# Patient Record
Sex: MALE | Race: WHITE | Employment: FULL TIME | URBAN - METROPOLITAN AREA
[De-identification: names, ages, dates, MRNs, and addresses within clinical notes are randomized per-mention and may not be internally consistent; named-entity substitution may affect disease eponyms.]

---

## 2021-12-16 ENCOUNTER — HOSPITAL ENCOUNTER (INPATIENT)
Age: 51
LOS: 7 days | Discharge: HOME | DRG: 286 | End: 2021-12-23
Attending: EMERGENCY MEDICINE | Admitting: HOSPITALIST
Payer: COMMERCIAL

## 2021-12-16 ENCOUNTER — APPOINTMENT (OUTPATIENT)
Dept: GENERAL RADIOLOGY | Age: 51
DRG: 286 | End: 2021-12-16
Attending: EMERGENCY MEDICINE
Payer: COMMERCIAL

## 2021-12-16 ENCOUNTER — APPOINTMENT (OUTPATIENT)
Dept: CT IMAGING | Age: 51
DRG: 286 | End: 2021-12-16
Attending: HOSPITALIST
Payer: COMMERCIAL

## 2021-12-16 DIAGNOSIS — I50.9 ACUTE HEART FAILURE, UNSPECIFIED HEART FAILURE TYPE (HCC): Primary | ICD-10-CM

## 2021-12-16 DIAGNOSIS — J90 PLEURAL EFFUSION: ICD-10-CM

## 2021-12-16 DIAGNOSIS — E11.9 NEW ONSET TYPE 2 DIABETES MELLITUS (HCC): ICD-10-CM

## 2021-12-16 DIAGNOSIS — R77.8 ELEVATED TROPONIN: ICD-10-CM

## 2021-12-16 DIAGNOSIS — I50.9 HEART FAILURE, UNSPECIFIED HF CHRONICITY, UNSPECIFIED HEART FAILURE TYPE (HCC): ICD-10-CM

## 2021-12-16 PROBLEM — R06.00 DYSPNEA: Status: ACTIVE | Noted: 2021-12-16

## 2021-12-16 LAB
ALBUMIN SERPL-MCNC: 3.4 G/DL (ref 3.5–5)
ALBUMIN/GLOB SERPL: 1.1 {RATIO} (ref 1.1–2.2)
ALP SERPL-CCNC: 74 U/L (ref 45–117)
ALT SERPL-CCNC: 128 U/L (ref 12–78)
ANION GAP SERPL CALC-SCNC: 7 MMOL/L (ref 5–15)
AST SERPL-CCNC: 45 U/L (ref 15–37)
ATRIAL RATE: 109 BPM
BASOPHILS # BLD: 0.1 K/UL (ref 0–0.1)
BASOPHILS NFR BLD: 0 % (ref 0–1)
BILIRUB SERPL-MCNC: 1.4 MG/DL (ref 0.2–1)
BNP SERPL-MCNC: 1933 PG/ML
BUN SERPL-MCNC: 13 MG/DL (ref 6–20)
BUN/CREAT SERPL: 12 (ref 12–20)
CALCIUM SERPL-MCNC: 9 MG/DL (ref 8.5–10.1)
CALCULATED P AXIS, ECG09: 44 DEGREES
CALCULATED R AXIS, ECG10: 47 DEGREES
CALCULATED T AXIS, ECG11: 27 DEGREES
CHLORIDE SERPL-SCNC: 103 MMOL/L (ref 97–108)
CO2 SERPL-SCNC: 24 MMOL/L (ref 21–32)
COMMENT, HOLDF: NORMAL
COVID-19 RAPID TEST, COVR: NOT DETECTED
CREAT SERPL-MCNC: 1.06 MG/DL (ref 0.7–1.3)
DIAGNOSIS, 93000: NORMAL
DIFFERENTIAL METHOD BLD: ABNORMAL
EOSINOPHIL # BLD: 0.1 K/UL (ref 0–0.4)
EOSINOPHIL NFR BLD: 1 % (ref 0–7)
ERYTHROCYTE [DISTWIDTH] IN BLOOD BY AUTOMATED COUNT: 13.2 % (ref 11.5–14.5)
GLOBULIN SER CALC-MCNC: 3 G/DL (ref 2–4)
GLUCOSE SERPL-MCNC: 297 MG/DL (ref 65–100)
HCT VFR BLD AUTO: 47.1 % (ref 36.6–50.3)
HGB BLD-MCNC: 15.8 G/DL (ref 12.1–17)
IMM GRANULOCYTES # BLD AUTO: 0.1 K/UL (ref 0–0.04)
IMM GRANULOCYTES NFR BLD AUTO: 1 % (ref 0–0.5)
LACTATE SERPL-SCNC: 1.2 MMOL/L (ref 0.4–2)
LYMPHOCYTES # BLD: 2.3 K/UL (ref 0.8–3.5)
LYMPHOCYTES NFR BLD: 16 % (ref 12–49)
MCH RBC QN AUTO: 30.1 PG (ref 26–34)
MCHC RBC AUTO-ENTMCNC: 33.5 G/DL (ref 30–36.5)
MCV RBC AUTO: 89.7 FL (ref 80–99)
MONOCYTES # BLD: 1 K/UL (ref 0–1)
MONOCYTES NFR BLD: 7 % (ref 5–13)
NEUTS SEG # BLD: 11.3 K/UL (ref 1.8–8)
NEUTS SEG NFR BLD: 75 % (ref 32–75)
NRBC # BLD: 0 K/UL (ref 0–0.01)
NRBC BLD-RTO: 0 PER 100 WBC
P-R INTERVAL, ECG05: 172 MS
PLATELET # BLD AUTO: 289 K/UL (ref 150–400)
PMV BLD AUTO: 10.6 FL (ref 8.9–12.9)
POTASSIUM SERPL-SCNC: 4 MMOL/L (ref 3.5–5.1)
PROT SERPL-MCNC: 6.4 G/DL (ref 6.4–8.2)
Q-T INTERVAL, ECG07: 338 MS
QRS DURATION, ECG06: 106 MS
QTC CALCULATION (BEZET), ECG08: 455 MS
RBC # BLD AUTO: 5.25 M/UL (ref 4.1–5.7)
SAMPLES BEING HELD,HOLD: NORMAL
SODIUM SERPL-SCNC: 134 MMOL/L (ref 136–145)
SOURCE, COVRS: NORMAL
TROPONIN-HIGH SENSITIVITY: 74 NG/L (ref 0–76)
TROPONIN-HIGH SENSITIVITY: 74 NG/L (ref 0–76)
VENTRICULAR RATE, ECG03: 109 BPM
WBC # BLD AUTO: 14.9 K/UL (ref 4.1–11.1)

## 2021-12-16 PROCEDURE — 65660000000 HC RM CCU STEPDOWN

## 2021-12-16 PROCEDURE — 84484 ASSAY OF TROPONIN QUANT: CPT

## 2021-12-16 PROCEDURE — 87040 BLOOD CULTURE FOR BACTERIA: CPT

## 2021-12-16 PROCEDURE — 83880 ASSAY OF NATRIURETIC PEPTIDE: CPT

## 2021-12-16 PROCEDURE — 74011250636 HC RX REV CODE- 250/636: Performed by: EMERGENCY MEDICINE

## 2021-12-16 PROCEDURE — 93005 ELECTROCARDIOGRAM TRACING: CPT

## 2021-12-16 PROCEDURE — 74011250637 HC RX REV CODE- 250/637: Performed by: HOSPITALIST

## 2021-12-16 PROCEDURE — 99285 EMERGENCY DEPT VISIT HI MDM: CPT

## 2021-12-16 PROCEDURE — 71046 X-RAY EXAM CHEST 2 VIEWS: CPT

## 2021-12-16 PROCEDURE — 71275 CT ANGIOGRAPHY CHEST: CPT

## 2021-12-16 PROCEDURE — 36415 COLL VENOUS BLD VENIPUNCTURE: CPT

## 2021-12-16 PROCEDURE — 85025 COMPLETE CBC W/AUTO DIFF WBC: CPT

## 2021-12-16 PROCEDURE — 74011000636 HC RX REV CODE- 636: Performed by: RADIOLOGY

## 2021-12-16 PROCEDURE — 74011250636 HC RX REV CODE- 250/636: Performed by: HOSPITALIST

## 2021-12-16 PROCEDURE — 74011250637 HC RX REV CODE- 250/637: Performed by: EMERGENCY MEDICINE

## 2021-12-16 PROCEDURE — 80053 COMPREHEN METABOLIC PANEL: CPT

## 2021-12-16 PROCEDURE — 96360 HYDRATION IV INFUSION INIT: CPT

## 2021-12-16 PROCEDURE — 83605 ASSAY OF LACTIC ACID: CPT

## 2021-12-16 PROCEDURE — G0378 HOSPITAL OBSERVATION PER HR: HCPCS

## 2021-12-16 PROCEDURE — 94761 N-INVAS EAR/PLS OXIMETRY MLT: CPT

## 2021-12-16 PROCEDURE — 87635 SARS-COV-2 COVID-19 AMP PRB: CPT

## 2021-12-16 RX ORDER — GUAIFENESIN 100 MG/5ML
325 LIQUID (ML) ORAL
Status: COMPLETED | OUTPATIENT
Start: 2021-12-16 | End: 2021-12-16

## 2021-12-16 RX ORDER — MAGNESIUM SULFATE 100 %
4 CRYSTALS MISCELLANEOUS AS NEEDED
Status: DISCONTINUED | OUTPATIENT
Start: 2021-12-16 | End: 2021-12-23 | Stop reason: HOSPADM

## 2021-12-16 RX ORDER — DEXTROSE 50 % IN WATER (D50W) INTRAVENOUS SYRINGE
25-50 AS NEEDED
Status: DISCONTINUED | OUTPATIENT
Start: 2021-12-16 | End: 2021-12-23 | Stop reason: HOSPADM

## 2021-12-16 RX ORDER — CARVEDILOL 6.25 MG/1
12.5 TABLET ORAL 2 TIMES DAILY WITH MEALS
Status: DISCONTINUED | OUTPATIENT
Start: 2021-12-16 | End: 2021-12-20

## 2021-12-16 RX ORDER — FUROSEMIDE 10 MG/ML
40 INJECTION INTRAMUSCULAR; INTRAVENOUS DAILY
Status: DISCONTINUED | OUTPATIENT
Start: 2021-12-16 | End: 2021-12-18

## 2021-12-16 RX ORDER — INSULIN LISPRO 100 [IU]/ML
INJECTION, SOLUTION INTRAVENOUS; SUBCUTANEOUS
Status: DISCONTINUED | OUTPATIENT
Start: 2021-12-16 | End: 2021-12-23 | Stop reason: HOSPADM

## 2021-12-16 RX ADMIN — CARVEDILOL 12.5 MG: 12.5 TABLET, FILM COATED ORAL at 19:13

## 2021-12-16 RX ADMIN — IOPAMIDOL 100 ML: 755 INJECTION, SOLUTION INTRAVENOUS at 15:44

## 2021-12-16 RX ADMIN — SODIUM CHLORIDE 1000 ML: 9 INJECTION, SOLUTION INTRAVENOUS at 13:49

## 2021-12-16 RX ADMIN — ASPIRIN 81 MG CHEWABLE TABLET 324 MG: 81 TABLET CHEWABLE at 16:16

## 2021-12-16 RX ADMIN — FUROSEMIDE 40 MG: 10 INJECTION, SOLUTION INTRAVENOUS at 19:13

## 2021-12-16 NOTE — Clinical Note
Right radial artery. Accessed successfully. Radial access needle used. Using ultrasound guidance.  Number of attempts =  1.

## 2021-12-16 NOTE — Clinical Note
Right brachial vein. Accessed successfully. Radial access needle used. Using ultrasound guidance. Number of attempts =  3.

## 2021-12-16 NOTE — ED PROVIDER NOTES
69-year-old male, vaccinated for Covid, presents with complaints of 1 week dyspnea on exertion associated with fatigue. Patient reports [facility] and was advised to come to the emergency department for abnormal EKG. [facility] significant for WBC of 14, glucose of 327. Patient denies any history of high blood sugar, diabetes. Denies fever, chills, nausea, vomiting, diarrhea, constipation. Denies chest pain. [facility]id test at [facility] last night pending. Denies leg swelling, asymmetry. Denies hemoptysis. Denies history of DVT/PE. Denies any recent immobilization/trauma. Denies tobacco use, drug use, alcohol use  [facility] shows sinus tachycardia, poor R wave progression, no acute ischemia. No past medical history on file. No past surgical history on file. No family history on file. Social History     Socioeconomic History    Marital status: SINGLE     Spouse name: Not on file    Number of children: Not on file    Years of education: Not on file    Highest education level: Not on file   Occupational History    Not on file   Tobacco Use    Smoking status: Not on file    Smokeless tobacco: Not on file   Substance and Sexual Activity    Alcohol use: Not on file    Drug use: Not on file    Sexual activity: Not on file   Other Topics Concern    Not on file   Social History Narrative    Not on file     Social Determinants of Health     Financial Resource Strain:     Difficulty of Paying Living Expenses: Not on file   Food Insecurity:     Worried About Running Out of Food in the Last Year: Not on file    Joy of Food in the Last Year: Not on file   Transportation Needs:     Lack of Transportation (Medical): Not on file    Lack of Transportation (Non-Medical):  Not on file   Physical Activity:     Days of Exercise per Week: Not on file    Minutes of Exercise per Session: Not on file   Stress:     Feeling of Stress : Not on file   Social Connections:     Frequency of Communication with Friends and Family: Not on file    Frequency of Social Gatherings with Friends and Family: Not on file    Attends Hinduism Services: Not on file    Active Member of Clubs or Organizations: Not on file    Attends Club or Organization Meetings: Not on file    Marital Status: Not on file   Intimate Partner Violence:     Fear of Current or Ex-Partner: Not on file    Emotionally Abused: Not on file    Physically Abused: Not on file    Sexually Abused: Not on file   Housing Stability:     Unable to Pay for Housing in the Last Year: Not on file    Number of [place] in the Last Year: Not on file    Unstable Housing in the Last Year: Not on file         ALLERGIES: Penicillins    Review of Systems   Constitutional: Negative for chills and fever. HENT: Negative for congestion, nosebleeds and sore throat. Eyes: Negative for pain and discharge. Respiratory: Positive for shortness of breath. Negative for cough. Cardiovascular: Negative for chest pain and palpitations. Gastrointestinal: Negative for abdominal pain, constipation, nausea and vomiting. Genitourinary: Negative for decreased urine volume, dysuria, flank pain and urgency. Musculoskeletal: Negative for gait problem and myalgias. Skin: Negative for rash and wound. Neurological: Negative for seizures and syncope. Hematological: Does not bruise/bleed easily. Psychiatric/Behavioral: Negative for confusion, self-injury and suicidal ideas. Vitals:    12/16/21 1206 12/16/21 1457   BP: 113/70 (!) 148/94   Pulse: (!) 115 (!) 104   Resp: 28 22   Temp: 97.6 °F (36.4 °C) 97.8 °F (36.6 °C)   SpO2: 96% 96%            Physical Exam  Vitals and nursing note reviewed. Constitutional:       Appearance: He is well-developed. He is obese. HENT:      Head: Normocephalic and atraumatic.    Eyes:      Pupils: Pupils are equal, round, and reactive to light. Cardiovascular:      Rate and Rhythm: Normal rate and regular rhythm. Heart sounds: Normal heart sounds. Pulmonary:      Effort: Pulmonary effort is normal. No respiratory distress. Breath sounds: Normal breath sounds. No wheezing. Abdominal:      General: Bowel sounds are normal.      Palpations: Abdomen is soft. Tenderness: There is no abdominal tenderness. There is no guarding or rebound. Musculoskeletal:         General: Normal range of motion. Cervical back: Normal range of motion and neck supple. Skin:     General: Skin is warm and dry. Neurological:      Mental Status: He is alert and oriented to person, place, and time. Psychiatric:         Behavior: Behavior normal.          MDM  Number of Diagnoses or Management Options  Diagnosis management comments: 25-year-old male presents with complaints of 1 week worsening shortness of breath. Patient is obese, tachycardic, mildly tachypneic, normal room oxygen saturation, clear to auscultation bilaterally, afebrile, nontoxic, hemodynamically stable. Planchest x-ray, CBC/CMP/cardiac enzymes/BNP, IV fluid hydration, lactate, blood cultures. Chest x-ray shows bilateral small pleural effusions  Labs remarkable for troponin 74, WBC 14.9         Amount and/or Complexity of Data Reviewed  Clinical lab tests: ordered and reviewed  Tests in the radiology section of CPT®: ordered and reviewed  Independent visualization of images, tracings, or specimens: yes    Patient Progress  Patient progress: improved         Procedures      ED EKG interpretation:  Rhythm: sinus tachycardia; and regular . Rate (approx.): 109; Axis: normal; P wave: normal; QRS interval: normal ; ST/T wave: normal; Other findings: low voltage, no acute ischemia. This EKG was interpreted by Goyo Masterson MD,ED Provider. 3:19 PM  Discussed results with patient. Patient reports feeling much improved. Discussed need for admission.   Patient expresses understanding. Denies any history of heart failure. Perfect Serve Consult for Admission  3:26 PM    ED Room Number: R33/R33  Patient Name and age:  Nile Bolton 46 y.o.  male  Working Diagnosis:   1. Acute heart failure, unspecified heart failure type ([address]    2. Pleural effusion    3.  Elevated troponin        COVID-19 Suspicion:  no  Sepsis present:  no  Reassessment needed: no  Code Status:  Full Code  Readmission: no  Isolation Requirements:  no  Recommended Level of Care:  telemetry  Department:[facility] Adult ED - 21

## 2021-12-16 NOTE — H&P
History & Physical    Primary Care Provider: None  Source of Information: Patient      History of Presenting Illness:   Richard Linn is a 46 y.o. male who presents with dyspnea     14-year-old male, vaccinated for Covid, presents with complaints of 1 week dyspnea on exertion associated with fatigue. Patient reports he [facility] night and was advised to come to the emergency department for abnormal EKG. [facility] significant for WBC of 14, glucose of 327. Patient denies any history of high blood sugar, diabetes. denied CHF, denied cad. Not taking any medications at home. Denies fever, chills, nausea, vomiting, diarrhea, constipation. Denies chest pain. [facility] at [facility] last night pending. Denies leg swelling, asymmetry. Denies hemoptysis. Denies history of DVT/PE. Denies any recent immobilization/trauma. In ER, noticed dyspnea after mild exertion, and difficulty to lie flat due to sob      Review of Systems:  General: HPI, no changes of weight  HEENT: no headache, no vision changes, no nose discharge, no hearing changes   RES: HPI, no cough   CVS: no cp, no palpitation. Muscular: no joint swelling, no muscle pain, no leg swelling  Skin: no rash, no itching   GI: no vomiting, no diarrhea  : no dysuria, no hematuria  Hemo: no gum bleeding, no petechial   Neuro: no sensation changes, no focal weakness   Endo: no polydipsia   Psych: denied depression     No past medical history on file. denied   No past surgical history on file. denied   Prior to Admission medications    Not on File   not taking any   Allergies   Allergen Reactions    Penicillins Unknown (comments)     Pt reports it happened when he was little \"my mother told me to never take it. It made me very sick\"      No family history on file. Father  of CVA at 52's.    SOCIAL HISTORY:  Patient resides:  Independently x   Assisted Living    SNF With family care       Smoking history:   None x   Former    Chronic      Alcohol history:   None x   Social    Chronic      Ambulates:   Independently x   w/cane    w/walker    w/wc    CODE STATUS:  DNR    Full x   Other      Objective:     Physical Exam:     Visit Vitals  BP (!) 148/94 (BP 1 Location: Right upper arm, BP Patient Position: At rest)   Pulse (!) 104   Temp 97.8 °F (36.6 °C)   Resp 22   SpO2 96%      O2 Device: None (Room air)    General:  Alert, cooperative, no distress, appears stated age. Head:  Normocephalic, without obvious abnormality, atraumatic. Eyes:  Conjunctivae/corneas clear. PERRL, EOMs intact. Nose: Nares normal. Septum midline. Mucosa normal. No drainage or sinus tenderness. Throat: Lips, mucosa, and tongue normal    Neck: Supple, symmetrical, trachea midline, no adenopathy, thyroid: no enlargement/tenderness/nodules, no carotid bruit and no JVD. Back:   Symmetric, no curvature. ROM normal. No CVA tenderness. Lungs:   Clear to auscultation bilaterally. No wheezing. Chest wall:  No tenderness or deformity. Heart:  Regular rate and rhythm, S1, S2 normal, no murmur, click, rub or gallop. Abdomen:   Soft, non-tender. Bowel sounds normal. No masses,  No organomegaly. Extremities: Extremities normal, atraumatic, no cyanosis or edema. Pulses: 2+ and symmetric all extremities. Skin: Skin color, texture, turgor normal. No rashes or lesions   Neurologic: CNII-XII intact. None focal            Data Review:     Recent Days:  Recent Labs     12/16/21  1234   WBC 14.9*   HGB 15.8   HCT 47.1        Recent Labs     12/16/21  1234   *   K 4.0      CO2 24   *   BUN 13   CREA 1.06   CA 9.0   ALB 3.4*   *     No results for input(s): PH, PCO2, PO2, HCO3, FIO2 in the last 72 hours.     24 Hour Results:  Recent Results (from the past 24 hour(s))   EKG, 12 LEAD, INITIAL    Collection Time: 12/16/21 12:22 PM   Result Value Ref Range    Ventricular Rate 109 BPM    Atrial Rate 109 BPM    P-R Interval 172 ms    QRS Duration 106 ms    Q-T Interval 338 ms    QTC Calculation (Bezet) 455 ms    Calculated P Axis 44 degrees    Calculated R Axis 47 degrees    Calculated T Axis 27 degrees    Diagnosis       Sinus tachycardia  Possible Left atrial enlargement  Low voltage QRS  Septal infarct , age undetermined  Abnormal ECG  No previous ECGs available  Confirmed by Jonas Conde MD ([id number]) on 12/16/2021 2:40:38 PM     TROPONIN-HIGH SENSITIVITY    Collection Time: 12/16/21 12:34 PM   Result Value Ref Range    Troponin-High Sensitivity 74 0 - 76 ng/L   CBC WITH AUTOMATED DIFF    Collection Time: 12/16/21 12:34 PM   Result Value Ref Range    WBC 14.9 (H) 4.1 - 11.1 K/uL    RBC 5.25 4. 10 - 5.70 M/uL    HGB 15.8 12.1 - 17.0 g/dL    HCT 47.1 36.6 - 50.3 %    MCV 89.7 80.0 - 99.0 FL    MCH 30.1 26.0 - 34.0 PG    MCHC 33.5 30.0 - 36.5 g/dL    RDW 13.2 11.5 - 14.5 %    PLATELET 702 064 - 599 K/uL    MPV 10.6 8.9 - 12.9 FL    NRBC 0.0 0  WBC    ABSOLUTE NRBC 0.00 0.00 - 0.01 K/uL    NEUTROPHILS 75 32 - 75 %    LYMPHOCYTES 16 12 - 49 %    MONOCYTES 7 5 - 13 %    EOSINOPHILS 1 0 - 7 %    BASOPHILS 0 0 - 1 %    IMMATURE GRANULOCYTES 1 (H) 0.0 - 0.5 %    ABS. NEUTROPHILS 11.3 (H) 1.8 - 8.0 K/UL    ABS. LYMPHOCYTES 2.3 0.8 - 3.5 K/UL    ABS. MONOCYTES 1.0 0.0 - 1.0 K/UL    ABS. EOSINOPHILS 0.1 0.0 - 0.4 K/UL    ABS. BASOPHILS 0.1 0.0 - 0.1 K/UL    ABS. IMM.  GRANS. 0.1 (H) 0.00 - 0.04 K/UL    DF AUTOMATED     METABOLIC PANEL, COMPREHENSIVE    Collection Time: 12/16/21 12:34 PM   Result Value Ref Range    Sodium 134 (L) 136 - 145 mmol/L    Potassium 4.0 3.5 - 5.1 mmol/L    Chloride 103 97 - 108 mmol/L    CO2 24 21 - 32 mmol/L    Anion gap 7 5 - 15 mmol/L    Glucose 297 (H) 65 - 100 mg/dL    BUN 13 6 - 20 MG/DL    Creatinine 1.06 0.70 - 1.30 MG/DL    BUN/Creatinine ratio 12 12 - 20      GFR est AA >60 >60 ml/min/1.73m2    GFR est non-AA >60 >60 ml/min/1.73m2    Calcium 9.0 8.5 - 10.1 MG/DL    Bilirubin, total 1.4 (H) 0.2 - 1.0 MG/DL    ALT (SGPT) 128 (H) 12 - 78 U/L    AST (SGOT) 45 (H) 15 - 37 U/L    Alk. phosphatase 74 45 - 117 U/L    Protein, total 6.4 6.4 - 8.2 g/dL    Albumin 3.4 (L) 3.5 - 5.0 g/dL    Globulin 3.0 2.0 - 4.0 g/dL    A-G Ratio 1.1 1.1 - 2.2     LACTIC ACID    Collection Time: 12/16/21 12:34 PM   Result Value Ref Range    Lactic acid 1.2 0.4 - 2.0 MMOL/L   SAMPLES BEING HELD    Collection Time: 12/16/21 12:34 PM   Result Value Ref Range    SAMPLES BEING HELD 1BLU 1RED     COMMENT        Add-on orders for these samples will be processed based on acceptable specimen integrity and analyte stability, which may vary by analyte. NT-PRO BNP    Collection Time: 12/16/21 12:34 PM   Result Value Ref Range    NT pro-BNP 1,933 (H) <125 PG/ML         Imaging:   XR CHEST PA LAT    Result Date: 12/16/2021  Very small bilateral pleural effusions. CTA CHEST W OR W WO CONT    Result Date: 12/16/2021  1. Cardiomegaly, and findings compatible with diminished right heart function. 2. Mild pulmonary edema and moderate bilateral pleural effusions. 3. This constellation of findings is suggestive of congestive heart failure. 4. No pulmonary embolism. Assessment:     Active Problems:    Dyspnea (12/16/2021)           Plan:     1. Acute CHF: CTA of chest reviewed, no PE. Start IV lasix 40 mg daily. Will start coreg. Pending Echo. Cardiologist consult. Repeat rapid covid pending   2. HTN: start coreg   3. Hyperglycemia: likely new diagnose of DM, SSI for now. DTC consult.  Check A1c        Signed By: Bj Vidal MD     December 16, 2021

## 2021-12-16 NOTE — Clinical Note
Patient Class[de-identified] OBSERVATION [104]   Type of Bed: Remote Telemetry [29]   Cardiac Monitoring Required?: Yes   Reason for Observation: dyspnea   Admitting Diagnosis: Dyspnea [[id number]]   Admitting Physician: Mat Li [[id number]   Attending Physician: Mat Li [[id number]]

## 2021-12-17 ENCOUNTER — APPOINTMENT (OUTPATIENT)
Dept: NON INVASIVE DIAGNOSTICS | Age: 51
DRG: 286 | End: 2021-12-17
Attending: HOSPITALIST
Payer: COMMERCIAL

## 2021-12-17 DIAGNOSIS — E11.9 NEW ONSET TYPE 2 DIABETES MELLITUS (HCC): Primary | ICD-10-CM

## 2021-12-17 LAB
ALBUMIN SERPL-MCNC: 3.1 G/DL (ref 3.5–5)
ALBUMIN/GLOB SERPL: 1.2 {RATIO} (ref 1.1–2.2)
ALP SERPL-CCNC: 62 U/L (ref 45–117)
ALT SERPL-CCNC: 109 U/L (ref 12–78)
ANION GAP SERPL CALC-SCNC: 6 MMOL/L (ref 5–15)
AST SERPL-CCNC: 37 U/L (ref 15–37)
BASOPHILS # BLD: 0.1 K/UL (ref 0–0.1)
BASOPHILS NFR BLD: 0 % (ref 0–1)
BILIRUB SERPL-MCNC: 1 MG/DL (ref 0.2–1)
BUN SERPL-MCNC: 17 MG/DL (ref 6–20)
BUN/CREAT SERPL: 16 (ref 12–20)
CALCIUM SERPL-MCNC: 8.5 MG/DL (ref 8.5–10.1)
CHLORIDE SERPL-SCNC: 104 MMOL/L (ref 97–108)
CO2 SERPL-SCNC: 25 MMOL/L (ref 21–32)
CREAT SERPL-MCNC: 1.05 MG/DL (ref 0.7–1.3)
DIFFERENTIAL METHOD BLD: ABNORMAL
ECHO AO ROOT DIAM: 3 CM
ECHO AO ROOT INDEX: 1.22 CM/M2
ECHO AV AREA PEAK VELOCITY: 2.1 CM2
ECHO AV AREA PEAK VELOCITY: 2.1 CM2
ECHO AV PEAK GRADIENT: 4 MMHG
ECHO AV PEAK VELOCITY: 1 M/S
ECHO AV VELOCITY RATIO: 0.6
ECHO EST RA PRESSURE: 12 MMHG
ECHO LA DIAMETER INDEX: 2.36 CM/M2
ECHO LA DIAMETER: 5.8 CM
ECHO LA TO AORTIC ROOT RATIO: 1.93
ECHO LV E' LATERAL VELOCITY: 7 CM/S
ECHO LV E' SEPTAL VELOCITY: 3 CM/S
ECHO LV FRACTIONAL SHORTENING: 11 % (ref 28–44)
ECHO LV INTERNAL DIMENSION DIASTOLE INDEX: 2.93 CM/M2
ECHO LV INTERNAL DIMENSION DIASTOLIC: 7.2 CM (ref 4.2–5.9)
ECHO LV INTERNAL DIMENSION SYSTOLIC INDEX: 2.6 CM/M2
ECHO LV INTERNAL DIMENSION SYSTOLIC: 6.4 CM
ECHO LV IVSD: 1 CM (ref 0.6–1)
ECHO LV MASS 2D: 296.6 G (ref 88–224)
ECHO LV MASS INDEX 2D: 120.6 G/M2 (ref 49–115)
ECHO LV POSTERIOR WALL DIASTOLIC: 0.8 CM (ref 0.6–1)
ECHO LV RELATIVE WALL THICKNESS RATIO: 0.22
ECHO LVOT AREA: 3.5 CM2
ECHO LVOT DIAM: 2.1 CM
ECHO LVOT PEAK GRADIENT: 1 MMHG
ECHO LVOT PEAK VELOCITY: 0.6 M/S
ECHO MV E VELOCITY: 0.89 M/S
ECHO MV E/E' LATERAL: 12.71
ECHO MV E/E' RATIO (AVERAGED): 21.19
ECHO MV E/E' SEPTAL: 29.67
ECHO PV MAX VELOCITY: 0.5 M/S
ECHO PV PEAK GRADIENT: 1 MMHG
ECHO RIGHT VENTRICULAR SYSTOLIC PRESSURE (RVSP): 39 MMHG
ECHO RV FREE WALL PEAK S': 7 CM/S
ECHO RV TAPSE: 1 CM (ref 1.5–2)
ECHO TV REGURGITANT MAX VELOCITY: 2.59 M/S
ECHO TV REGURGITANT PEAK GRADIENT: 27 MMHG
EOSINOPHIL # BLD: 0.4 K/UL (ref 0–0.4)
EOSINOPHIL NFR BLD: 3 % (ref 0–7)
ERYTHROCYTE [DISTWIDTH] IN BLOOD BY AUTOMATED COUNT: 13.3 % (ref 11.5–14.5)
EST. AVERAGE GLUCOSE BLD GHB EST-MCNC: 240 MG/DL
GLOBULIN SER CALC-MCNC: 2.5 G/DL (ref 2–4)
GLUCOSE BLD STRIP.AUTO-MCNC: 198 MG/DL (ref 65–117)
GLUCOSE BLD STRIP.AUTO-MCNC: 234 MG/DL (ref 65–117)
GLUCOSE BLD STRIP.AUTO-MCNC: 251 MG/DL (ref 65–117)
GLUCOSE BLD STRIP.AUTO-MCNC: 278 MG/DL (ref 65–117)
GLUCOSE BLD STRIP.AUTO-MCNC: 303 MG/DL (ref 65–117)
GLUCOSE SERPL-MCNC: 256 MG/DL (ref 65–100)
HBA1C MFR BLD: 10 % (ref 4–5.6)
HCT VFR BLD AUTO: 42.9 % (ref 36.6–50.3)
HGB BLD-MCNC: 14.7 G/DL (ref 12.1–17)
IMM GRANULOCYTES # BLD AUTO: 0.1 K/UL (ref 0–0.04)
IMM GRANULOCYTES NFR BLD AUTO: 1 % (ref 0–0.5)
LYMPHOCYTES # BLD: 2.6 K/UL (ref 0.8–3.5)
LYMPHOCYTES NFR BLD: 20 % (ref 12–49)
MAGNESIUM SERPL-MCNC: 1.8 MG/DL (ref 1.6–2.4)
MCH RBC QN AUTO: 30.4 PG (ref 26–34)
MCHC RBC AUTO-ENTMCNC: 34.3 G/DL (ref 30–36.5)
MCV RBC AUTO: 88.8 FL (ref 80–99)
MONOCYTES # BLD: 1.2 K/UL (ref 0–1)
MONOCYTES NFR BLD: 9 % (ref 5–13)
NEUTS SEG # BLD: 8.9 K/UL (ref 1.8–8)
NEUTS SEG NFR BLD: 67 % (ref 32–75)
NRBC # BLD: 0 K/UL (ref 0–0.01)
NRBC BLD-RTO: 0 PER 100 WBC
PHOSPHATE SERPL-MCNC: 3.7 MG/DL (ref 2.6–4.7)
PLATELET # BLD AUTO: 267 K/UL (ref 150–400)
PMV BLD AUTO: 10.4 FL (ref 8.9–12.9)
POTASSIUM SERPL-SCNC: 3.6 MMOL/L (ref 3.5–5.1)
PROT SERPL-MCNC: 5.6 G/DL (ref 6.4–8.2)
RBC # BLD AUTO: 4.83 M/UL (ref 4.1–5.7)
SERVICE CMNT-IMP: ABNORMAL
SODIUM SERPL-SCNC: 135 MMOL/L (ref 136–145)
WBC # BLD AUTO: 13.3 K/UL (ref 4.1–11.1)

## 2021-12-17 PROCEDURE — 74011250637 HC RX REV CODE- 250/637: Performed by: HOSPITALIST

## 2021-12-17 PROCEDURE — 99356 PR PROLONGED SVC I/P OR OBS SETTING 1ST HOUR: CPT | Performed by: CLINICAL NURSE SPECIALIST

## 2021-12-17 PROCEDURE — 80053 COMPREHEN METABOLIC PANEL: CPT

## 2021-12-17 PROCEDURE — 94760 N-INVAS EAR/PLS OXIMETRY 1: CPT

## 2021-12-17 PROCEDURE — 36415 COLL VENOUS BLD VENIPUNCTURE: CPT

## 2021-12-17 PROCEDURE — C8929 TTE W OR WO FOL WCON,DOPPLER: HCPCS

## 2021-12-17 PROCEDURE — 82962 GLUCOSE BLOOD TEST: CPT

## 2021-12-17 PROCEDURE — 74011250636 HC RX REV CODE- 250/636: Performed by: HOSPITALIST

## 2021-12-17 PROCEDURE — 85025 COMPLETE CBC W/AUTO DIFF WBC: CPT

## 2021-12-17 PROCEDURE — 83036 HEMOGLOBIN GLYCOSYLATED A1C: CPT

## 2021-12-17 PROCEDURE — 74011000250 HC RX REV CODE- 250: Performed by: INTERNAL MEDICINE

## 2021-12-17 PROCEDURE — 83735 ASSAY OF MAGNESIUM: CPT

## 2021-12-17 PROCEDURE — 74011636637 HC RX REV CODE- 636/637: Performed by: INTERNAL MEDICINE

## 2021-12-17 PROCEDURE — 84100 ASSAY OF PHOSPHORUS: CPT

## 2021-12-17 PROCEDURE — 74011636637 HC RX REV CODE- 636/637: Performed by: HOSPITALIST

## 2021-12-17 PROCEDURE — 99233 SBSQ HOSP IP/OBS HIGH 50: CPT | Performed by: CLINICAL NURSE SPECIALIST

## 2021-12-17 PROCEDURE — 65660000000 HC RM CCU STEPDOWN

## 2021-12-17 PROCEDURE — 74011250636 HC RX REV CODE- 250/636: Performed by: INTERNAL MEDICINE

## 2021-12-17 RX ORDER — ALOGLIPTIN 25 MG/1
25 TABLET, FILM COATED ORAL DAILY
Status: DISCONTINUED | OUTPATIENT
Start: 2021-12-18 | End: 2021-12-22

## 2021-12-17 RX ORDER — INSULIN GLARGINE 100 [IU]/ML
30 INJECTION, SOLUTION SUBCUTANEOUS
Status: DISCONTINUED | OUTPATIENT
Start: 2021-12-17 | End: 2021-12-18

## 2021-12-17 RX ADMIN — Medication 3 UNITS: at 00:36

## 2021-12-17 RX ADMIN — INSULIN GLARGINE 30 UNITS: 100 INJECTION, SOLUTION SUBCUTANEOUS at 21:23

## 2021-12-17 RX ADMIN — CARVEDILOL 12.5 MG: 12.5 TABLET, FILM COATED ORAL at 17:59

## 2021-12-17 RX ADMIN — Medication 5 UNITS: at 06:47

## 2021-12-17 RX ADMIN — Medication 3 UNITS: at 18:10

## 2021-12-17 RX ADMIN — PERFLUTREN 1.5 ML: 6.52 INJECTION, SUSPENSION INTRAVENOUS at 08:46

## 2021-12-17 RX ADMIN — FUROSEMIDE 40 MG: 10 INJECTION, SOLUTION INTRAVENOUS at 09:17

## 2021-12-17 RX ADMIN — Medication 7 UNITS: at 12:39

## 2021-12-17 RX ADMIN — CARVEDILOL 12.5 MG: 12.5 TABLET, FILM COATED ORAL at 09:17

## 2021-12-17 NOTE — ROUTINE PROCESS
TRANSFER - OUT REPORT:    Verbal report given to Damaris Barajas RN(name) on Shiloh Townsend  being transferred to [facility](unit) for routine progression of care       Report consisted of patients Situation, Background, Assessment and   Recommendations(SBAR). Information from the following report(s) SBAR, Kardex, Intake/Output and MAR was reviewed with the receiving nurse. Lines:   Peripheral IV 12/16/21 Right Antecubital (Active)   Site Assessment Clean, dry, & intact 12/16/21 1236   Phlebitis Assessment 0 12/16/21 1236   Infiltration Assessment 0 12/16/21 1236   Dressing Status Clean, dry, & intact 12/16/21 1236   Dressing Type Topical skin adhesive;Transparent 12/16/21 1236   Hub Color/Line Status Capped;Flushed;Patent 12/16/21 1236   Action Taken Blood drawn 12/16/21 1236   Alcohol Cap Used Yes 12/16/21 1236       Peripheral IV 12/16/21 Left Forearm (Active)   Site Assessment Clean, dry, & intact 12/16/21 1241   Phlebitis Assessment 0 12/16/21 1241   Infiltration Assessment 0 12/16/21 1241   Dressing Status Clean, dry, & intact 12/16/21 1241   Dressing Type Topical skin adhesive;Transparent 12/16/21 1241   Hub Color/Line Status Pink;Capped;Flushed;Patent 12/16/21 1241   Action Taken Blood drawn 12/16/21 1241   Alcohol Cap Used Yes 12/16/21 1241        Opportunity for questions and clarification was provided.       Patient transported with:   [employer]

## 2021-12-17 NOTE — CARDIO/PULMONARY
Cardiac Rehab: Consult received and chart reviewed. Echo is pending. The following criteria is for potential candidates for cardiac rehab.  EF ? 35% at time of enrollment   Stable class 2-4 NYHA heart failure   Optimal medical therapy for > 6 weeks   No major hospitalizations within the last 6 weeks   No major hospitalizations within the next 6 weeks  Julisa Persaud RN

## 2021-12-17 NOTE — PROGRESS NOTES
[address] [facility] Adult  Hospitalist Group                                                                                          Hospitalist Progress Note  Av Mejía MD  Answering service: [phone] from in house phone        Date of Service:  2021  NAME:  Gautam Martinez  :  1970  MRN:  [id number]      Admission Summary:     Gautam Martinez is a 46 y.o. male who presents with dyspnea      68-year-old male, vaccinated for Covid, presents with complaints of 1 week dyspnea on exertion associated with fatigue.  Patient reports he went [facility] last night and was advised to come to the emergency department for abnormal EKG. [facility] from [facility] significant for WBC of 14, glucose of 327.  Patient denies any history of high blood sugar, diabetes.  denied CHF, denied cad. Not taking any medications at home. Denies fever, chills, nausea, vomiting, diarrhea, constipation.  Denies chest pain.  [facility] pending.  Denies leg swelling, asymmetry.  Denies hemoptysis.  Denies history of DVT/PE.  Denies any recent immobilization/trauma. In ER, noticed dyspnea after mild exertion, and difficulty to lie flat due to sob           Interval history / Subjective:   Reports improvement in shortness of breath, accurate I's and O's not available.   Blood sugars remain elevated  Hemoglobin A1c is 10     Assessment & Plan:     Acute congestive heart failure(unclear type, echocardiogram pending)  -Patient presented with shortness of breath, noted to have elevated proBNP and clinical volume overload  -Does not have a history of CHF  -Started on Lasix 40 mg IV daily  -I's and O's, daily weight  -Echocardiogram pending  -Continue Coreg started on this admission  -Pending cardiology consult  -Check lipid panel and TSH    New onset diabetes with hyperglycemia:  -Hemoglobin A1c is 10, blood sugars remain elevated  -We will start Lantus 30 units daily with alogliptin 25 units daily  -May introduce Metformin on discharge  -Add sliding scale insulin  -Diabetes education appreciated  -Check lipid panel    Hypertension:  -Blood pressure better controlled with Coreg  -Continue Coreg for now    History of brain aneurysm:  -Status post clipping in  1997      Code status: Full code  DVT prophylaxis: Not needed, patient is up and ambulatory    Care Plan discussed with: Patient/Family  Anticipated Disposition: Home w/Family  Anticipated Discharge: 24 hours to 48 hours     Hospital Problems  Never Reviewed          Codes Class Noted POA    Dyspnea ICD-10-CM: R06.00  ICD-9-CM: 786.09  12/16/2021 Unknown        CHF (congestive heart failure) ([address] ICD-10-CM: I50.9  ICD-9-CM: 428.0  12/16/2021 Unknown                Review of Systems:   A comprehensive review of systems was negative except for that written in the HPI. Vital Signs:    Last 24hrs VS reviewed since prior progress note. Most recent are:  Visit Vitals  BP (!) 144/98 (BP 1 Location: Left upper arm, BP Patient Position: At rest;Sitting)   Pulse 98   Temp 97.8 °F (36.6 °C)   Resp 20   Ht 5' 9\" (1.753 m)   Wt 137.4 kg (303 lb)   SpO2 98%   BMI 44.75 kg/m²         Intake/Output Summary (Last 24 hours) at 12/17/2021 1438  Last data filed at 12/17/2021 1241  Gross per 24 hour   Intake 1000 ml   Output 1550 ml   Net -550 ml        Physical Examination:     I had a face to face encounter with this patient and independently examined them on 12/17/2021 as outlined below:          Constitutional:  No acute distress, cooperative, pleasant    ENT:  Oral mucosa moist, oropharynx benign. Resp:  CTA bilaterally. No wheezing/rhonchi/rales. No accessory muscle use   CV:  Regular rhythm, normal rate, no murmurs, gallops, rubs    GI:  Soft, non distended, non tender. normoactive bowel sounds, no hepatosplenomegaly     Musculoskeletal:  No edema, warm, 2+ pulses throughout    Neurologic:  Moves all extremities.   AAOx3, CN II-XII reviewed            Data Review:    Review and/or order of clinical lab test      Labs:     Recent Labs     12/17/21 0227 12/16/21  1234   WBC 13.3* 14.9*   HGB 14.7 15.8   HCT 42.9 47.1    289     Recent Labs     12/17/21 0227 12/16/21  1234   * 134*   K 3.6 4.0    103   CO2 25 24   BUN 17 13   CREA 1.05 1.06   * 297*   CA 8.5 9.0   MG 1.8  --    PHOS 3.7  --      Recent Labs     12/17/21 0227 12/16/21  1234   * 128*   AP 62 74   TBILI 1.0 1.4*   TP 5.6* 6.4   ALB 3.1* 3.4*   GLOB 2.5 3.0     No results for input(s): INR, PTP, APTT, INREXT in the last 72 hours. No results for input(s): FE, TIBC, PSAT, FERR in the last 72 hours. No results found for: FOL, RBCF   No results for input(s): PH, PCO2, PO2 in the last 72 hours. No results for input(s): CPK, CKNDX, TROIQ in the last 72 hours.     No lab exists for component: CPKMB  No results found for: CHOL, CHOLX, CHLST, CHOLV, HDL, HDLP, LDL, LDLC, DLDLP, TGLX, TRIGL, TRIGP, CHHD, CHHDX  Lab Results   Component Value Date/Time    Glucose (POC) 303 (H) 12/17/2021 11:25 AM    Glucose (POC) 251 (H) 12/17/2021 06:38 AM    Glucose (POC) 278 (H) 12/17/2021 12:00 AM     Lab Results   Component Value Date/Time    Color YELLOW 09/19/2010 06:04 AM    Appearance CLEAR 09/19/2010 06:04 AM    Specific gravity 1.023 09/19/2010 06:04 AM    pH (UA) 5.0 09/19/2010 06:04 AM    Protein NEGATIVE  09/19/2010 06:04 AM    Glucose NEGATIVE  09/19/2010 06:04 AM    Ketone NEGATIVE  09/19/2010 06:04 AM    Bilirubin NEGATIVE  09/19/2010 06:04 AM    Urobilinogen 0.2 09/19/2010 06:04 AM    Nitrites NEGATIVE  09/19/2010 06:04 AM    Leukocyte Esterase NEGATIVE  09/19/2010 06:04 AM         Medications Reviewed:     Current Facility-Administered Medications   Medication Dose Route Frequency    insulin glargine (LANTUS) injection 30 Units  30 Units SubCUTAneous QHS    [START ON 12/18/2021] alogliptin (NESINA) tablet 25 mg  25 mg Oral DAILY    carvediloL (COREG) tablet 12.5 mg  12.5 mg Oral BID WITH MEALS    furosemide (LASIX) injection 40 mg  40 mg IntraVENous DAILY    glucose chewable tablet 16 g  4 Tablet Oral PRN    dextrose (D50W) injection syrg 12.5-25 g  25-50 mL IntraVENous PRN    glucagon (GLUCAGEN) injection 1 mg  1 mg IntraMUSCular PRN    insulin lispro (HUMALOG) injection   SubCUTAneous AC&HS     ______________________________________________________________________  EXPECTED LENGTH OF STAY: - - -  ACTUAL LENGTH OF STAY:          Renan Tillman MD

## 2021-12-17 NOTE — ROUTINE PROCESS
TRANSFER - IN REPORT:    [facility]  [facility] received from ED for routine progression of care      Report consisted of patients Situation, Background, Assessment and   Recommendations(SBAR). Information from the following report(s) SBAR, Kardex and ED Summary was reviewed with the receiving nurse. Opportunity for questions and clarification was provided. Assessment completed upon patients arrival to unit and care assumed.

## 2021-12-17 NOTE — NURSE NAVIGATOR
Chart reviewed by Heart Failure Nurse Navigator. Heart Failure database completed. EF:  Echo pending    ACEi/ARB/ARNi:     BB: coreg 12.5 mg twice daily    Aldosterone Antagonist:     Obstructive Sleep Apnea Screening: Screening priority 1   STOP-BANG score:   Referred to Sleep Medicine:     CRT not currently indicated     NYHA Functional Class documentation requested. Heart Failure Teach Back in Patient Education. Heart Failure Avoiding Triggers on Discharge Instructions. Cardiologist: Dr Luana Galindo ([facility]) has been consulted      Post discharge follow up phone call to be made within 48-72 hours of discharge.

## 2021-12-17 NOTE — DIABETES MGMT
[address] NURSE SPECIALIST CONSULT     Initial Presentation   Moises Young is a 46 y.o. male admitted  with c/o 1 week JAFFE with fatigue. Had abnormal EKG at urgent care and was told to come to ED. Labs from urgent care: / leukocytosis. HX: No past medical history on file. INITIAL DX:   Dyspnea [R06.00]  CHF (congestive heart failure) ([address] [I50.9]     Current Treatment     TX: ECHO/ CTA chest-Cardiomegaly, and findings compatible with diminished right heart function. 2. Mild pulmonary edema and moderate bilateral pleural effusions. 3. This constellation of findings is suggestive of congestive heart failure    Consulted by Provider for advanced diabetes nursing assessment and care for:   [x] Inpatient management strategy  [x] Home management assessment  [x] Survival skill education    Hospital Course   Clinical progress has been complicated by new diagnosis of diabetes and HF- . Diabetes History   New on set diabetes-     Diabetes-related Medical History  Acute complications  hyperglycemia  Neurological complications  none  Microvascular disease  none  Macrovascular disease  none  Other associated conditions     HF-new onset    Diabetes Medication History  Key Antihyperglycemic Medications     Patient is on no antihyperglycemic meds. Diabetes self-management practices:   Eating pattern- followed keto diet 6-7 yrs and lost over 140lbs and then it got to be too much and has put some of that lost weight back on.        Physical activity pattern- works full time as [employer] tech-no activity outside of work     Monitoring pattern-NA     Taking medications pattern-NA    Social determinants of health impacting diabetes self-management practices   Concerned that you need to know more about how to stay healthy with diabetes  Overall evaluation:    [x] NOT Achieving A1c target with drug therapy & self-care practices    Subjective   I matty knew I may become a diabetic\"    +strong family history  Works full time -AV technician  Has 15yo daughter   Objective   Physical exam  General Obese male in no acute distress. Conversant and cooperative  Neuro  Alert, oriented   Vital Signs   Visit Vitals  BP (!) 144/98 (BP 1 Location: Left upper arm, BP Patient Position: At rest;Sitting)   Pulse 100   Temp 97.8 °F (36.6 °C)   Resp 20   Ht 5' 9\" (1.753 m)   Wt 137.4 kg (303 lb)   SpO2 98%   BMI 44.75 kg/m²     Skin  Warm and dry. Heart   Regular rate and rhythm. No murmurs, rubs or gallops  Lungs  Clear to auscultation without rales or rhonchi  Extremities No foot wounds    Diabetic foot exam:    Left Foot     Visual Exam: normal    Pulse DP: 2+ (normal)   Filament test: normal sensation      Right Foot   Visual Exam: normal    Pulse DP: 2+ (normal)   Filament test: normal sensation     DP & PT pulses +2.      Laboratory  Recent Labs     12/17/21  0227 12/16/21  1234   * 297*   AGAP 6 7   WBC 13.3* 14.9*   CREA 1.05 1.06   GFRNA >60 >60   AST 37 45*   * 128*       Factors impacting BG management  Factor Dose Comments   Nutrition:  Standard meals     60 grams/meal      Other:   Kidney function  Liver function     GFR >60      Blood glucose pattern      Significant diabetes-related events over the past 24-72 hours  Admitting   On correctional only   12/17 fasting   12/17: pre prandial 303    Assessment and Plan   Nursing Diagnosis Risk for unstable blood glucose pattern   Nursing Intervention Domain 3812 Decision-making Support   Nursing Interventions Examined current inpatient diabetes/blood glucose control   Explored factors facilitating and impeding inpatient management  Explored corrective strategies with patient and responsible inpatient provider   Informed patient of rational for insulin strategy while hospitalized     Nursing Diagnosis [id number] Ineffective Health Management   Nursing Intervention Domain 302 [facility]   Nursing Interventions Identified diabetes self-management practices impeding diabetes control  Discussed diabetes survival skills related to  1. Healthy Plate eating plan; given handouts  2. Role of physical activity in improving insulin sensitivity and action  3. Procedure for blood glucose monitoring & options for ow-cost products available from [facility]   4. Medications plan at discharge    Diabetes Education Checklist    Pathophysiology  [x] Diabetes basics  [x] Differences between type 1 and type 2    Diagnostic Criteria  [x] Interpretation of Labs  [x] Hemoglobin A1c  [x] Blood glucose goals  Notes: Goal A1C 7%, patients A1C ____  Goal glucose under 200    Blood Glucose Monitoring  [x] Using a glucometer  [x] When to check BG  [x] Record keeping  Notes: Patient simulated obtaining a blood glucose with a home glucometer glucometer. Discussed they will need to obtain a meter, lancets and strips. Patient instructed to obtain a glucose reading before meals and bedtime and if the \"don't feel right\"  Patient to create a log of blood sugar readings and present to their PCP for long term management. Insulin  [x] Long-acting insulin  [x] Rapid-acting insulin  [x] Using insulin pens / vials  [x] Injection sites & site rotation  [x] Proper storage  [x] Insulin expiration guidelines  [x] Sharps disposal  Notes: Discussed the difference between long acting and short acting insulin  Used an insulin pen demo and simulated an insulin injection      Hypoglycemia  [x] Signs & symptoms  [x] Rule of 15 and other treatment  Notes: If patient feels dizzy, light headed or \"woozy\" patient to obtain a blood glucose reading. If blood glucose is under 70, patient instructed to drink 4-6 oz of milk, juice or regular soda then recheck 15 minutes later. If glucose under 70, repeat with another 4-6 oz regular juice/soda/milk.   Once glucose over 70, eat a 15 gram snack like 1/2 peanut butter sandwich or meal.      Hyperglycemia  [x] Signs & symptoms      Physical Activity & Exercise  [x] Review of current routine  [x] Impact on BG levels  [x] BG targets for physical activity  [x] When to avoid physical activity  Notes: Patient instructed to increase activity every week once stabilized at home. Activity can consist of walking, chores around his house, garden, cut grass. Nutrition Basics  [x] Starter tips for meal planning  [x] Meal & snack schedule  [x] Reading food labels  [x] Balanced plate method  [x] How different foods impact BG levels  [x] Carbohydrate food sources  [x] Carbohydrate counting        [x] Low carb meal & snack options  Notes:       Reducing Risks  [x] Long-term complications of diabetes  [x] Health Maintenance  [x] Healthy coping        [] Need for behavioral health counseling  Notes:            Evaluation   This very pleasant  male , with NEW ONSET Type 2 diabetes, did not achieve diabetes control prior to admission, as evidenced by A1c of 10%. Currently admitted for SOB on JAFFE- getting HF work up [facility] [place] cardiology consulted - Admitting , and subsequent BG have trended >200. +STrong family history of DM2 per his verbalization (mom/sister/etc). Verbalized increased thirst and voiding more frequently. Denies blurry vision, numbness/tingling in hands/feet. Was receptive to education provided and engaged in discussion. Given his above target A1C, he will require insulin at discharge along with oral medication. If LVEF <50%, would also start on SGLT2 that provides cardio protection and assists with offloading fluids. He would also benefit from a GLP-1 weekly injection to assist with weight loss and is also cardio protective. While in hospital he will require basal/bolus and correction insulin regimen to get BG into target 100-180.   This patient would benefit from diabetes self-management education and support [facility]) after discharge- will put in a referral.        During this hospitalization, the patient has not achieved inpatient blood glucose target of 100-180mg/dl. Several factors have played a role in blood glucose management including:  [x] Critical nature of illness state  [x] Compromised insulin absorption or delivery      The Subcutaneous Insulin Order set (1511) has not been in use. Hence, the next step in optimizing blood glucose control would be    [x] Implement the Subcutaneous Insulin order set  [x]  Optimize basal insulin dosing   [x]  Add mealtime insulin    Recommendations     [] Use of Subcutaneous Insulin Order set (1935)  Insulin Dosing Specific recommendation   START Basal                                      (Based on weight, BMI & GFR) 0.2 units/kg/D= 15 units NPH BID- START NOW      If pre prandial BG >200 despite basal insulin, ADD pre meal Nutritional                                      (Based on CHO/dextrose load) [x] Normal sensitivity  6units TID Humalog    CONTINUE Corrective                        (Useful in adjusting insulin dosing) [x] Normal sensitivity        Discharge Planning   1. Prescription for glucometer kit (Meter, Lancets (100), Strips (100)). Patient to obtain a blood glucose reading four times daily. First thing in the morning prior to eating and drinking anything then before lunch, dinner and bedtime. Create a log and present to PCP for interpretation    2. Will require insulin at discharge- NPH PEN at hospital doses ( THIS medication is covered preferrred level 1 by his insurance)    3. If he will require lispro for correction at discharge -normal correction scale - Lispro is also covered by his insurance)    4. On Discharge, please place an outpatient order for \"diabetes education\" (enter as REF20). This will trigger a referral for the [facility] which includes outpatient diabetes self management training with a certified diabetes educator.- WILL PUT IN REFERRAL     5. Consider starting Metformin  mg daily - advance as appropriate    6.   NO PCP on file- will need PCP follow up for diabetes management- Case management consult ordered. .     Billing Code(s)   65982  53204    Before making these care recommendations, I personally reviewed the hospitalization record, including notes, laboratory & diagnostic data and current medications, and examined the patient at the bedside (circumstances permitting) before making care recommendations.      Total minutes: [address] Moreno  Diabetes Clinical Nurse Specialist  [facility]  Access via [address] [place] [address]

## 2021-12-18 LAB
ANION GAP SERPL CALC-SCNC: 5 MMOL/L (ref 5–15)
BASOPHILS # BLD: 0.1 K/UL (ref 0–0.1)
BASOPHILS NFR BLD: 0 % (ref 0–1)
BUN SERPL-MCNC: 20 MG/DL (ref 6–20)
BUN/CREAT SERPL: 20 (ref 12–20)
CALCIUM SERPL-MCNC: 8.6 MG/DL (ref 8.5–10.1)
CHLORIDE SERPL-SCNC: 105 MMOL/L (ref 97–108)
CO2 SERPL-SCNC: 27 MMOL/L (ref 21–32)
CREAT SERPL-MCNC: 1.02 MG/DL (ref 0.7–1.3)
DIFFERENTIAL METHOD BLD: ABNORMAL
EOSINOPHIL # BLD: 0.3 K/UL (ref 0–0.4)
EOSINOPHIL NFR BLD: 3 % (ref 0–7)
ERYTHROCYTE [DISTWIDTH] IN BLOOD BY AUTOMATED COUNT: 13.3 % (ref 11.5–14.5)
GLUCOSE BLD STRIP.AUTO-MCNC: 178 MG/DL (ref 65–117)
GLUCOSE BLD STRIP.AUTO-MCNC: 183 MG/DL (ref 65–117)
GLUCOSE BLD STRIP.AUTO-MCNC: 210 MG/DL (ref 65–117)
GLUCOSE BLD STRIP.AUTO-MCNC: 248 MG/DL (ref 65–117)
GLUCOSE SERPL-MCNC: 199 MG/DL (ref 65–100)
HCT VFR BLD AUTO: 42.4 % (ref 36.6–50.3)
HGB BLD-MCNC: 14.7 G/DL (ref 12.1–17)
IMM GRANULOCYTES # BLD AUTO: 0.1 K/UL (ref 0–0.04)
IMM GRANULOCYTES NFR BLD AUTO: 1 % (ref 0–0.5)
LYMPHOCYTES # BLD: 3 K/UL (ref 0.8–3.5)
LYMPHOCYTES NFR BLD: 24 % (ref 12–49)
MCH RBC QN AUTO: 30.7 PG (ref 26–34)
MCHC RBC AUTO-ENTMCNC: 34.7 G/DL (ref 30–36.5)
MCV RBC AUTO: 88.5 FL (ref 80–99)
MONOCYTES # BLD: 1 K/UL (ref 0–1)
MONOCYTES NFR BLD: 8 % (ref 5–13)
NEUTS SEG # BLD: 8 K/UL (ref 1.8–8)
NEUTS SEG NFR BLD: 64 % (ref 32–75)
NRBC # BLD: 0 K/UL (ref 0–0.01)
NRBC BLD-RTO: 0 PER 100 WBC
PLATELET # BLD AUTO: 254 K/UL (ref 150–400)
PMV BLD AUTO: 10.5 FL (ref 8.9–12.9)
POTASSIUM SERPL-SCNC: 3.7 MMOL/L (ref 3.5–5.1)
RBC # BLD AUTO: 4.79 M/UL (ref 4.1–5.7)
SERVICE CMNT-IMP: ABNORMAL
SODIUM SERPL-SCNC: 137 MMOL/L (ref 136–145)
WBC # BLD AUTO: 12.4 K/UL (ref 4.1–11.1)

## 2021-12-18 PROCEDURE — 74011636637 HC RX REV CODE- 636/637: Performed by: HOSPITALIST

## 2021-12-18 PROCEDURE — 65660000000 HC RM CCU STEPDOWN

## 2021-12-18 PROCEDURE — 74011250636 HC RX REV CODE- 250/636: Performed by: HOSPITALIST

## 2021-12-18 PROCEDURE — 74011250636 HC RX REV CODE- 250/636: Performed by: INTERNAL MEDICINE

## 2021-12-18 PROCEDURE — 74011250637 HC RX REV CODE- 250/637: Performed by: INTERNAL MEDICINE

## 2021-12-18 PROCEDURE — 80048 BASIC METABOLIC PNL TOTAL CA: CPT

## 2021-12-18 PROCEDURE — 85025 COMPLETE CBC W/AUTO DIFF WBC: CPT

## 2021-12-18 PROCEDURE — 82962 GLUCOSE BLOOD TEST: CPT

## 2021-12-18 PROCEDURE — 36415 COLL VENOUS BLD VENIPUNCTURE: CPT

## 2021-12-18 PROCEDURE — 74011250637 HC RX REV CODE- 250/637: Performed by: HOSPITALIST

## 2021-12-18 PROCEDURE — 74011636637 HC RX REV CODE- 636/637: Performed by: INTERNAL MEDICINE

## 2021-12-18 RX ORDER — INSULIN GLARGINE 100 [IU]/ML
40 INJECTION, SOLUTION SUBCUTANEOUS
Status: DISCONTINUED | OUTPATIENT
Start: 2021-12-18 | End: 2021-12-19

## 2021-12-18 RX ORDER — FUROSEMIDE 10 MG/ML
40 INJECTION INTRAMUSCULAR; INTRAVENOUS
Status: COMPLETED | OUTPATIENT
Start: 2021-12-18 | End: 2021-12-19

## 2021-12-18 RX ORDER — FUROSEMIDE 10 MG/ML
40 INJECTION INTRAMUSCULAR; INTRAVENOUS
Status: DISCONTINUED | OUTPATIENT
Start: 2021-12-18 | End: 2021-12-18

## 2021-12-18 RX ORDER — LOSARTAN POTASSIUM 50 MG/1
25 TABLET ORAL
Status: DISCONTINUED | OUTPATIENT
Start: 2021-12-18 | End: 2021-12-20

## 2021-12-18 RX ADMIN — CARVEDILOL 12.5 MG: 12.5 TABLET, FILM COATED ORAL at 09:05

## 2021-12-18 RX ADMIN — FUROSEMIDE 40 MG: 10 INJECTION, SOLUTION INTRAVENOUS at 09:06

## 2021-12-18 RX ADMIN — Medication 3 UNITS: at 13:17

## 2021-12-18 RX ADMIN — Medication 3 UNITS: at 17:32

## 2021-12-18 RX ADMIN — INSULIN GLARGINE 40 UNITS: 100 INJECTION, SOLUTION SUBCUTANEOUS at 21:39

## 2021-12-18 RX ADMIN — FUROSEMIDE 40 MG: 10 INJECTION, SOLUTION INTRAVENOUS at 17:32

## 2021-12-18 RX ADMIN — CARVEDILOL 12.5 MG: 12.5 TABLET, FILM COATED ORAL at 17:33

## 2021-12-18 RX ADMIN — ALOGLIPTIN 25 MG: 25 TABLET, FILM COATED ORAL at 09:06

## 2021-12-18 RX ADMIN — LOSARTAN POTASSIUM 25 MG: 50 TABLET, FILM COATED ORAL at 21:39

## 2021-12-18 RX ADMIN — Medication 2 UNITS: at 07:09

## 2021-12-18 NOTE — PROGRESS NOTES
[facility]  Hospitalist Group                                                                                          Hospitalist Progress Note  Arabella Simmons MD  Answering service: [phone] from in house phone        Date of Service:  2021  NAME:  Alan Faulkner  :  1970  MRN:  [id number]      Admission Summary:     Alan Faulkner is a 46 y.o. male who presents with dyspnea      55-year-old male, vaccinated for Covid, presents with complaints of 1 week dyspnea on exertion associated with fatigue.  Patient reports [facility] and was advised to come to the emergency department for abnormal EKG. [address] work from [facility] significant for WBC of 14, glucose of 327.  Patient denies any history of high blood sugar, diabetes.  denied CHF, denied cad. Not taking any medications at home. Denies fever, chills, nausea, vomiting, diarrhea, constipation.  Denies chest pain.  Covid test at [facility] last night pending.  Denies leg swelling, asymmetry.  Denies hemoptysis.  Denies history of DVT/PE.  Denies any recent immobilization/trauma. In ER, noticed dyspnea after mild exertion, and difficulty to lie flat due to sob           Interval history / Subjective:   Reports improvement in shortness of breath, accurate I's and O's not available.   Blood sugars remain elevated  Hemoglobin A1c is 10  Echocardiogram showed EF of 15 to 20%     Assessment & Plan:     Acute new onset systolic congestive heart failure POA  -Patient presented with shortness of breath, noted to have elevated proBNP and clinical volume overload  -Does not have a history of CHF  -Started on Lasix 40 mg IV daily  -I's and O's, daily weight  -Echocardiogram showed EF of 15 to 20%  -Continue Coreg started on this admission  -Pending cardiology consult  -Pending lipid panel and TSH  -Patient's clinical volume status is improving  -Due to low EF patient will need ischemic work-up, will defer to cardiology  -We will also defer starting Entresto by cardiology at this point    New onset diabetes with hyperglycemia:  -Hemoglobin A1c is 10, blood sugars remain elevated  -Started on Lantus 30 units daily with alogliptin 25 units daily  -Increase Lantus to 40 units daily  -May introduce Metformin on discharge  -Add sliding scale insulin  -Diabetes education appreciated  -Pending lipid panel    Hypertension:  -Blood pressure better controlled with Coreg  -Continue Coreg for now  -Patient likely will have to be introduced with Entresto due to low EF    History of brain aneurysm:  -Status post clipping in  1997      Code status: Full code  DVT prophylaxis: Not needed, patient is up and ambulatory    Care Plan discussed with: Patient/Family  Anticipated Disposition: Home w/Family  Anticipated Discharge: 24 hours to 48 hours     Hospital Problems  Never Reviewed          Codes Class Noted POA    Dyspnea ICD-10-CM: R06.00  ICD-9-CM: 786.09  12/16/2021 Unknown        CHF (congestive heart failure) ([facility] ICD-10-CM: I50.9  ICD-9-CM: 428.0  12/16/2021 Unknown                Review of Systems:   A comprehensive review of systems was negative except for that written in the HPI. Vital Signs:    Last 24hrs VS reviewed since prior progress note. Most recent are:  Visit Vitals  /77 (BP 1 Location: Right upper arm, BP Patient Position: At rest)   Pulse 91   Temp 97.7 °F (36.5 °C)   Resp 18   Ht 5' 9\" (1.753 m)   Wt 137.2 kg (302 lb 8 oz)   SpO2 97%   BMI 44.67 kg/m²         Intake/Output Summary (Last 24 hours) at 12/18/2021 1303  Last data filed at 12/17/2021 1800  Gross per 24 hour   Intake    Output 625 ml   Net -625 ml        Physical Examination:     I had a face to face encounter with this patient and independently examined them on 12/18/2021 as outlined below:          Constitutional:  No acute distress, cooperative, pleasant    ENT:  Oral mucosa moist, oropharynx benign. Resp: CTA bilaterally. No wheezing/rhonchi/rales. No accessory muscle use   CV:  Regular rhythm, normal rate, no murmurs, gallops, rubs    GI:  Soft, non distended, non tender. normoactive bowel sounds, no hepatosplenomegaly     Musculoskeletal:  No edema, warm, 2+ pulses throughout    Neurologic:  Moves all extremities. AAOx3, CN II-XII reviewed            Data Review:    Review and/or order of clinical lab test      Labs:     Recent Labs     12/18/21 0520 12/17/21 0227   WBC 12.4* 13.3*   HGB 14.7 14.7   HCT 42.4 42.9    267     Recent Labs     12/18/21 0520 12/17/21 0227 12/16/21  1234    135* 134*   K 3.7 3.6 4.0    104 103   CO2 27 25 24   BUN 20 17 13   CREA 1.02 1.05 1.06   * 256* 297*   CA 8.6 8.5 9.0   MG  --  1.8  --    PHOS  --  3.7  --      Recent Labs     12/17/21 0227 12/16/21  1234   * 128*   AP 62 74   TBILI 1.0 1.4*   TP 5.6* 6.4   ALB 3.1* 3.4*   GLOB 2.5 3.0     No results for input(s): INR, PTP, APTT, INREXT, INREXT in the last 72 hours. No results for input(s): FE, TIBC, PSAT, FERR in the last 72 hours. No results found for: FOL, RBCF   No results for input(s): PH, PCO2, PO2 in the last 72 hours. No results for input(s): CPK, CKNDX, TROIQ in the last 72 hours.     No lab exists for component: CPKMB  No results found for: CHOL, CHOLX, CHLST, CHOLV, HDL, HDLP, LDL, LDLC, DLDLP, TGLX, TRIGL, TRIGP, CHHD, CHHDX  Lab Results   Component Value Date/Time    Glucose (POC) 248 (H) 12/18/2021 11:49 AM    Glucose (POC) 178 (H) 12/18/2021 05:23 AM    Glucose (POC) 198 (H) 12/17/2021 08:54 PM    Glucose (POC) 234 (H) 12/17/2021 05:02 PM    Glucose (POC) 303 (H) 12/17/2021 11:25 AM     Lab Results   Component Value Date/Time    Color YELLOW 09/19/2010 06:04 AM    Appearance CLEAR 09/19/2010 06:04 AM    Specific gravity 1.023 09/19/2010 06:04 AM    pH (UA) 5.0 09/19/2010 06:04 AM    Protein NEGATIVE  09/19/2010 06:04 AM    Glucose NEGATIVE  09/19/2010 06:04 AM Ketone NEGATIVE  09/19/2010 06:04 AM    Bilirubin NEGATIVE  09/19/2010 06:04 AM    Urobilinogen 0.2 09/19/2010 06:04 AM    Nitrites NEGATIVE  09/19/2010 06:04 AM    Leukocyte Esterase NEGATIVE  09/19/2010 06:04 AM         Medications Reviewed:     Current Facility-Administered Medications   Medication Dose Route Frequency    insulin glargine (LANTUS) injection 30 Units  30 Units SubCUTAneous QHS    alogliptin (NESINA) tablet 25 mg  25 mg Oral DAILY    carvediloL (COREG) tablet 12.5 mg  12.5 mg Oral BID WITH MEALS    furosemide (LASIX) injection 40 mg  40 mg IntraVENous DAILY    glucose chewable tablet 16 g  4 Tablet Oral PRN    dextrose (D50W) injection syrg 12.5-25 g  25-50 mL IntraVENous PRN    glucagon (GLUCAGEN) injection 1 mg  1 mg IntraMUSCular PRN    insulin lispro (HUMALOG) injection   SubCUTAneous AC&HS     ______________________________________________________________________  EXPECTED LENGTH OF STAY: - - -  ACTUAL LENGTH OF STAY:          2                 Patti Diaz MD

## 2021-12-18 NOTE — CONSULTS
[facility] Cardiology Consultation    Date of Consult:  12/18/21  Date of Admission: 12/16/2021  Primary Cardiologist: None  Physician Requesting consult: Dr. Harris Mcardle / Reason for Consult:   Dyspnea    History of Present Illness:  Jayne Espinosa is a 46 y.o. male with the below listed medical history who was admitted with dyspnea. Consult ordered 12/16/21. Re-called today 12/18/21 as no provider had seen the patient. Presented with 1 week of progressive JAFFE and fatigue. Also some ankle edema. No known CV conditions. Rapid COVID test negative. Reported orthopnea. Denies CP. Echo shows severe global LV hypokinesis, EF 15-20%. HbA1c suggestive of diabetes mellitus. Known to have hypertension. Was not seeing physicians on a regular basis. Feels improved since he has been here, but still with some orthopnea and JAFFE. Quit smoking in 2009. Previously smoked about 2 ppd for about 20 years. No FHx of CHF or CAD. PMH  HTN    Prior to Admission medications    Not on File       Current Facility-Administered Medications   Medication Dose Route Frequency    insulin glargine (LANTUS) injection 30 Units  30 Units SubCUTAneous QHS    alogliptin (NESINA) tablet 25 mg  25 mg Oral DAILY    carvediloL (COREG) tablet 12.5 mg  12.5 mg Oral BID WITH MEALS    furosemide (LASIX) injection 40 mg  40 mg IntraVENous DAILY    glucose chewable tablet 16 g  4 Tablet Oral PRN    dextrose (D50W) injection syrg 12.5-25 g  25-50 mL IntraVENous PRN    glucagon (GLUCAGEN) injection 1 mg  1 mg IntraMUSCular PRN    insulin lispro (HUMALOG) injection   SubCUTAneous AC&HS       No pertinent CV family history.     Social History     Socioeconomic History    Marital status: SINGLE     Spouse name: Not on file    Number of children: Not on file    Years of education: Not on file    Highest education level: Not on file   Occupational History    Not on file   Tobacco Use    Smoking status: Not on file    Smokeless tobacco: Not on file   Substance and Sexual Activity    Alcohol use: Not on file    Drug use: Not on file    Sexual activity: Not on file   Other Topics Concern    Not on file   Social History Narrative    Not on file     Social Determinants of Health     Financial Resource Strain:     Difficulty of Paying Living Expenses: Not on file   Food Insecurity:     Worried About Running Out of Food in the Last Year: Not on file    Joy of Food in the Last Year: Not on file   Transportation Needs:     Lack of Transportation (Medical): Not on file    Lack of Transportation (Non-Medical): Not on file   Physical Activity:     Days of Exercise per Week: Not on file    Minutes of Exercise per Session: Not on file   Stress:     Feeling of Stress : Not on file   Social Connections:     Frequency of Communication with Friends and Family: Not on file    Frequency of Social Gatherings with Friends and Family: Not on file    Attends Buddhism Services: Not on file    Active Member of [address] [place] [address] or Organizations: Not on file    Attends Club or Organization Meetings: Not on file    Marital Status: Not on file   Intimate Partner Violence:     Fear of Current or Ex-Partner: Not on file    Emotionally Abused: Not on file    Physically Abused: Not on file    Sexually Abused: Not on file   Housing Stability:     Unable to Pay for Housing in the Last Year: Not on file    Number of [place] in the Last Year: Not on file    Unstable Housing in the Last Year: Not on file       Review of Systems   All other systems reviewed and are negative.       Visit Vitals  /77 (BP 1 Location: Right upper arm, BP Patient Position: At rest)   Pulse (!) 104   Temp 97.7 °F (36.5 °C)   Resp 18   Ht 5' 9\" (1.753 m)   Wt 137.2 kg (302 lb 8 oz)   SpO2 97%   BMI 44.67 kg/m²         Intake/Output Summary (Last 24 hours) at 12/18/2021 1258  Last data filed at 12/17/2021 1800  Gross per 24 hour   Intake    Output 625 ml   Net -625 ml        Physical Exam  GEN: NAD, appears older than stated age  [de-identified]: EOMI, MMM, OP clear, poor dentition with several rotten teeth  NECK: Normal JVP, carotids 2+ b/l and symmetrical  CV: RRR, normal S1 and S2, no M/R/G  LUNGS: Minimal bibasilar inspiratory crackles  ABD: NABS, soft, NT/ND  EXT: 1+ pitting edema in b/l ankles, 2+ and symmetrical radial pulses b/l  PSYCH: Mood and affect normal  NEURO: AAO, MAEW, face symmetrical, speech intact    Lab Review:  BMP:   Lab Results   Component Value Date/Time     12/18/2021 05:20 AM    K 3.7 12/18/2021 05:20 AM     12/18/2021 05:20 AM    CO2 27 12/18/2021 05:20 AM    AGAP 5 12/18/2021 05:20 AM     (H) 12/18/2021 05:20 AM    BUN 20 12/18/2021 05:20 AM    CREA 1.02 12/18/2021 05:20 AM    GFRAA >60 12/18/2021 05:20 AM    GFRNA >60 12/18/2021 05:20 AM        CBC:  Lab Results   Component Value Date/Time    WBC 12.4 (H) 12/18/2021 05:20 AM    HGB 14.7 12/18/2021 05:20 AM    HCT 42.4 12/18/2021 05:20 AM    PLATELET 970 82/57/5215 05:20 AM    MCV 88.5 12/18/2021 05:20 AM       All Cardiac Markers in the last 24 hours:  No results found for: CPK, CK, CKMMB, CKMB, RCK3, CKMBT, CKMBPOC, CKNDX, CKND1, MISAEL, TROPT, TROIQ, SIDRA, TROPT, TNIPOC, BNP, BNPP, BNPNT    No results found for: CHOL, CHOLPOCT, CHOLX, CHLST, CHOLV, HDL, HDLPOC, HDLP, LDL, LDLCPOC, LDLC, DLDLP, VLDLC, VLDL, TGLX, TRIGL, TRIGP, TGLPOCT, CHHD, CHHDX     Data Review:  ECG tracing personally reviewed:   Sinus tachycardia, low voltage, IVCD, PRWP  Echocardiogram:  12/16/21    ECHO ADULT COMPLETE 12/17/2021 12/17/2021    Interpretation Summary    Left Ventricle: Left ventricle is dilated. Normal wall thickness. Global hypokinesis present. The EF by visual approximation is 15 - 20%.   Right Ventricle: Right ventricle is mildly dilated. Reduced systolic function.   Mitral Valve: Mild transvalvular regurgitation.   Left Atrium: Left atrium is dilated.     Right Atrium: Right atrium is dilated.   Contrast used: Definity. Signed by: Shobha Watson MD on 12/17/2021  2:44 PM    Personally reviewed echo with findings as above/below. Other imaging:  CTA chest:  IMPRESSION  1. Cardiomegaly, and findings compatible with diminished right heart function. 2. Mild pulmonary edema and moderate bilateral pleural effusions. 3. This constellation of findings is suggestive of congestive heart failure. 4. No pulmonary embolism. Assessment:    1. New diagnosis of acute combined systolic and diastolic CHF with EF 82-15%, NYHA class IV on admission   - Unclear if NICM or ICM; suspect NICM from longstanding HTN, DM2, obesity, metabolic syndrome  2. Dyspnea, likely due to #1  3. HTN  4. DM2 with other circulatory complications  5. Morbid obesity  6. Moderate/severe RV hypokinesis  7. Restrictive diastolic dysfunction  8. Mild mitral regurgitation    Recommendations / Plan:  - Increase furosemide to 40 mg BIDAC  - Strict Is and Os, daily weights  - Continue carvedilol 12.5 mg BID  - Start losartan 25 mg qhs  - May change to [facility] if has BP room  - Will eventually try to start MRA/SGLT2i  - Will need R/LHC; tentatively plan on Monday  - Will need LifeVest evaluation  - Please keep NPO after MN on Sunday    Thank you for the opportunity to participate in the care of Harry Alford and please do not hesitate to contact us should you have any questions. Signed:  Mariam Bryan.  Fei Parks, [facility] / [address] [place] [address] [facility]  12/18/21

## 2021-12-19 LAB
ANION GAP SERPL CALC-SCNC: 7 MMOL/L (ref 5–15)
BASOPHILS # BLD: 0.1 K/UL (ref 0–0.1)
BASOPHILS NFR BLD: 1 % (ref 0–1)
BUN SERPL-MCNC: 22 MG/DL (ref 6–20)
BUN/CREAT SERPL: 21 (ref 12–20)
CALCIUM SERPL-MCNC: 8.8 MG/DL (ref 8.5–10.1)
CHLORIDE SERPL-SCNC: 104 MMOL/L (ref 97–108)
CO2 SERPL-SCNC: 28 MMOL/L (ref 21–32)
CREAT SERPL-MCNC: 1.05 MG/DL (ref 0.7–1.3)
DIFFERENTIAL METHOD BLD: ABNORMAL
EOSINOPHIL # BLD: 0.4 K/UL (ref 0–0.4)
EOSINOPHIL NFR BLD: 3 % (ref 0–7)
ERYTHROCYTE [DISTWIDTH] IN BLOOD BY AUTOMATED COUNT: 13.2 % (ref 11.5–14.5)
GLUCOSE BLD STRIP.AUTO-MCNC: 125 MG/DL (ref 65–117)
GLUCOSE BLD STRIP.AUTO-MCNC: 150 MG/DL (ref 65–117)
GLUCOSE BLD STRIP.AUTO-MCNC: 169 MG/DL (ref 65–117)
GLUCOSE BLD STRIP.AUTO-MCNC: 174 MG/DL (ref 65–117)
GLUCOSE BLD STRIP.AUTO-MCNC: 229 MG/DL (ref 65–117)
GLUCOSE SERPL-MCNC: 153 MG/DL (ref 65–100)
HCT VFR BLD AUTO: 43.4 % (ref 36.6–50.3)
HGB BLD-MCNC: 14.7 G/DL (ref 12.1–17)
IMM GRANULOCYTES # BLD AUTO: 0.2 K/UL (ref 0–0.04)
IMM GRANULOCYTES NFR BLD AUTO: 1 % (ref 0–0.5)
LYMPHOCYTES # BLD: 3.1 K/UL (ref 0.8–3.5)
LYMPHOCYTES NFR BLD: 20 % (ref 12–49)
MCH RBC QN AUTO: 30.2 PG (ref 26–34)
MCHC RBC AUTO-ENTMCNC: 33.9 G/DL (ref 30–36.5)
MCV RBC AUTO: 89.1 FL (ref 80–99)
MONOCYTES # BLD: 1.5 K/UL (ref 0–1)
MONOCYTES NFR BLD: 10 % (ref 5–13)
NEUTS SEG # BLD: 9.9 K/UL (ref 1.8–8)
NEUTS SEG NFR BLD: 65 % (ref 32–75)
NRBC # BLD: 0 K/UL (ref 0–0.01)
NRBC BLD-RTO: 0 PER 100 WBC
PLATELET # BLD AUTO: 270 K/UL (ref 150–400)
PMV BLD AUTO: 10.4 FL (ref 8.9–12.9)
POTASSIUM SERPL-SCNC: 3.5 MMOL/L (ref 3.5–5.1)
RBC # BLD AUTO: 4.87 M/UL (ref 4.1–5.7)
SERVICE CMNT-IMP: ABNORMAL
SODIUM SERPL-SCNC: 139 MMOL/L (ref 136–145)
WBC # BLD AUTO: 15.1 K/UL (ref 4.1–11.1)

## 2021-12-19 PROCEDURE — 65660000000 HC RM CCU STEPDOWN

## 2021-12-19 PROCEDURE — 74011250637 HC RX REV CODE- 250/637: Performed by: HOSPITALIST

## 2021-12-19 PROCEDURE — 74011250637 HC RX REV CODE- 250/637: Performed by: INTERNAL MEDICINE

## 2021-12-19 PROCEDURE — 74011250636 HC RX REV CODE- 250/636: Performed by: INTERNAL MEDICINE

## 2021-12-19 PROCEDURE — 36415 COLL VENOUS BLD VENIPUNCTURE: CPT

## 2021-12-19 PROCEDURE — 74011636637 HC RX REV CODE- 636/637: Performed by: HOSPITALIST

## 2021-12-19 PROCEDURE — 82962 GLUCOSE BLOOD TEST: CPT

## 2021-12-19 PROCEDURE — 85025 COMPLETE CBC W/AUTO DIFF WBC: CPT

## 2021-12-19 PROCEDURE — 80048 BASIC METABOLIC PNL TOTAL CA: CPT

## 2021-12-19 PROCEDURE — 74011636637 HC RX REV CODE- 636/637: Performed by: INTERNAL MEDICINE

## 2021-12-19 RX ORDER — INSULIN GLARGINE 100 [IU]/ML
50 INJECTION, SOLUTION SUBCUTANEOUS
Status: DISCONTINUED | OUTPATIENT
Start: 2021-12-19 | End: 2021-12-22

## 2021-12-19 RX ORDER — POTASSIUM CHLORIDE 750 MG/1
40 TABLET, FILM COATED, EXTENDED RELEASE ORAL 2 TIMES DAILY WITH MEALS
Status: DISPENSED | OUTPATIENT
Start: 2021-12-19 | End: 2021-12-20

## 2021-12-19 RX ORDER — ATORVASTATIN CALCIUM 40 MG/1
40 TABLET, FILM COATED ORAL DAILY
Status: DISCONTINUED | OUTPATIENT
Start: 2021-12-20 | End: 2021-12-23 | Stop reason: HOSPADM

## 2021-12-19 RX ADMIN — POTASSIUM CHLORIDE 40 MEQ: 750 TABLET, FILM COATED, EXTENDED RELEASE ORAL at 16:53

## 2021-12-19 RX ADMIN — INSULIN GLARGINE 50 UNITS: 100 INJECTION, SOLUTION SUBCUTANEOUS at 22:00

## 2021-12-19 RX ADMIN — Medication 2 UNITS: at 07:10

## 2021-12-19 RX ADMIN — Medication 3 UNITS: at 13:10

## 2021-12-19 RX ADMIN — LOSARTAN POTASSIUM 25 MG: 50 TABLET, FILM COATED ORAL at 22:34

## 2021-12-19 RX ADMIN — ALOGLIPTIN 25 MG: 25 TABLET, FILM COATED ORAL at 16:54

## 2021-12-19 RX ADMIN — FUROSEMIDE 40 MG: 10 INJECTION, SOLUTION INTRAVENOUS at 07:10

## 2021-12-19 RX ADMIN — CARVEDILOL 12.5 MG: 12.5 TABLET, FILM COATED ORAL at 16:53

## 2021-12-19 RX ADMIN — FUROSEMIDE 40 MG: 10 INJECTION, SOLUTION INTRAVENOUS at 16:53

## 2021-12-19 NOTE — PROGRESS NOTES
[facility] Adult  Hospitalist Group                                                                                          Hospitalist Progress Note  Galdino Morrow MD  Answering service: [phone] from in house phone        Date of Service:  2021  NAME:  Marsa Ormond  :  1970  MRN:  [id number]      Admission Summary:     Marsa Ormond is a 46 y.o. male who presents with dyspnea      60-year-old male, vaccinated for Covid, presents with complaints of 1 week dyspnea on exertion associated with fatigue.  Patient reports he [facility] night and was advised to come to the emergency department for abnormal EKG. 3 [facility] [place] [facility] work from [facility] significant for WBC of 14, glucose of 327.  Patient denies any history of high blood sugar, diabetes.  denied CHF, denied cad. Not taking any medications at home. Denies fever, chills, nausea, vomiting, diarrhea, constipation.  Denies chest pain.  Covid test at [facility] last night pending.  Denies leg swelling, asymmetry.  Denies hemoptysis.  Denies history of DVT/PE.  Denies any recent immobilization/trauma.   In ER, noticed dyspnea after mild exertion, and difficulty to lie flat due to sob           Interval history / Subjective:   Reports significant improvement in shortness of breath after his Lasix was increased to 40 mg twice daily  Reports his orthopnea is improving significantly as well     Assessment & Plan:     Acute new onset systolic congestive heart failure POA  -Patient presented with shortness of breath, noted to have elevated proBNP and clinical volume overload  -Does not have a history of CHF  -Started on Lasix 40 mg IV daily and increased to 40 mg IV twice daily   -I's and O's, daily weight, documented fluid balance is -4 L since admission  -Echocardiogram showed EF of 15 to 20%  -Continue Coreg started on this admission  -Appreciated cardiology consult, added losartan  -Plan for right and left heart catheterization tomorrow  -Patient's clinical volume status is improving  -We will also defer starting Entresto by cardiology at this point  -Patient will need a LifeVest on discharge    New onset diabetes with hyperglycemia:  -Hemoglobin A1c is 10, blood sugars remain elevated  -Started on Lantus 30 units daily with alogliptin 25 units daily  -Increase Lantus to 50 units daily, blood sugars better controlled  -May introduce Metformin on discharge  -Continue sliding scale insulin  -Patient will benefit from SGLT2 inhibitors as an outpatient due to presence of congestive heart failure  -Diabetes education appreciated  -Pending lipid panel    Hypertension:  -Blood pressure better controlled with Coreg  -Continue Coreg for now  -Patient likely will have to be introduced with Entresto due to low EF    History of brain aneurysm:  -Status post clipping in  1997      Code status: Full code  DVT prophylaxis: Not needed, patient is up and ambulatory    Care Plan discussed with: Patient/Family  Anticipated Disposition: Home w/Family  Anticipated Discharge: 24 hours to 48 hours     Hospital Problems  Never Reviewed          Codes Class Noted POA    Dyspnea ICD-10-CM: R06.00  ICD-9-CM: 786.09  12/16/2021 Unknown        CHF (congestive heart failure) ([facility] [address] ICD-10-CM: I50.9  ICD-9-CM: 428.0  12/16/2021 Unknown                Review of Systems:   A comprehensive review of systems was negative except for that written in the HPI. Vital Signs:    Last 24hrs VS reviewed since prior progress note.  Most recent are:  Visit Vitals  /63 (BP 1 Location: Right upper arm, BP Patient Position: Sitting)   Pulse 88   Temp 97.8 °F (36.6 °C)   Resp 18   Ht 5' 9\" (1.753 m)   Wt 137.2 kg (302 lb 8 oz)   SpO2 95%   BMI 44.67 kg/m²         Intake/Output Summary (Last 24 hours) at 12/19/2021 1214  Last data filed at 12/18/2021 1938  Gross per 24 hour   Intake 1280 ml   Output 1900 ml   Net -620 ml        Physical Examination:     I had a face to face encounter with this patient and independently examined them on 12/19/2021 as outlined below:          Constitutional:  No acute distress, cooperative, pleasant    ENT:  Oral mucosa moist, oropharynx benign. Resp:  CTA bilaterally. No wheezing/rhonchi/rales. No accessory muscle use   CV:  Regular rhythm, normal rate, no murmurs, gallops, rubs    GI:  Soft, non distended, non tender. normoactive bowel sounds, no hepatosplenomegaly     Musculoskeletal:  No edema, warm, 2+ pulses throughout    Neurologic:  Moves all extremities. AAOx3, CN II-XII reviewed            Data Review:    Review and/or order of clinical lab test      Labs:     Recent Labs     12/19/21 0341 12/18/21 0520   WBC 15.1* 12.4*   HGB 14.7 14.7   HCT 43.4 42.4    254     Recent Labs     12/19/21 0341 12/18/21 0520 12/17/21 0227    137 135*   K 3.5 3.7 3.6    105 104   CO2 28 27 25   BUN 22* 20 17   CREA 1.05 1.02 1.05   * 199* 256*   CA 8.8 8.6 8.5   MG  --   --  1.8   PHOS  --   --  3.7     Recent Labs     12/17/21 0227 12/16/21  1234   * 128*   AP 62 74   TBILI 1.0 1.4*   TP 5.6* 6.4   ALB 3.1* 3.4*   GLOB 2.5 3.0     No results for input(s): INR, PTP, APTT, INREXT, INREXT in the last 72 hours. No results for input(s): FE, TIBC, PSAT, FERR in the last 72 hours. No results found for: FOL, RBCF   No results for input(s): PH, PCO2, PO2 in the last 72 hours. No results for input(s): CPK, CKNDX, TROIQ in the last 72 hours.     No lab exists for component: CPKMB  No results found for: CHOL, CHOLX, CHLST, CHOLV, HDL, HDLP, LDL, LDLC, DLDLP, TGLX, TRIGL, TRIGP, CHHD, CHHDX  Lab Results   Component Value Date/Time    Glucose (POC) 229 (H) 12/19/2021 11:40 AM    Glucose (POC) 174 (H) 12/19/2021 08:13 AM    Glucose (POC) 150 (H) 12/19/2021 06:56 AM    Glucose (POC) 183 (H) 12/18/2021 09:31 PM    Glucose (POC) 210 (H) 12/18/2021 04:56 PM     Lab Results   Component Value Date/Time    Color YELLOW 09/19/2010 06:04 AM    Appearance CLEAR 09/19/2010 06:04 AM    Specific gravity 1.023 09/19/2010 06:04 AM    pH (UA) 5.0 09/19/2010 06:04 AM    Protein NEGATIVE  09/19/2010 06:04 AM    Glucose NEGATIVE  09/19/2010 06:04 AM    Ketone NEGATIVE  09/19/2010 06:04 AM    Bilirubin NEGATIVE  09/19/2010 06:04 AM    Urobilinogen 0.2 09/19/2010 06:04 AM    Nitrites NEGATIVE  09/19/2010 06:04 AM    Leukocyte Esterase NEGATIVE  09/19/2010 06:04 AM         Medications Reviewed:     Current Facility-Administered Medications   Medication Dose Route Frequency    potassium chloride SR (KLOR-CON 10) tablet 40 mEq  40 mEq Oral BID WITH MEALS    insulin glargine (LANTUS) injection 40 Units  40 Units SubCUTAneous QHS    losartan (COZAAR) tablet 25 mg  25 mg Oral QHS    furosemide (LASIX) injection 40 mg  40 mg IntraVENous ACB&D    alogliptin (NESINA) tablet 25 mg  25 mg Oral DAILY    carvediloL (COREG) tablet 12.5 mg  12.5 mg Oral BID WITH MEALS    glucose chewable tablet 16 g  4 Tablet Oral PRN    dextrose (D50W) injection syrg 12.5-25 g  25-50 mL IntraVENous PRN    glucagon (GLUCAGEN) injection 1 mg  1 mg IntraMUSCular PRN    insulin lispro (HUMALOG) injection   SubCUTAneous AC&HS     ______________________________________________________________________  EXPECTED LENGTH OF STAY: - - -  ACTUAL LENGTH OF STAY:          3                 Christiano Green MD

## 2021-12-19 NOTE — PROGRESS NOTES
[facility] Cardiology Progress Note    Date of Service: 12/19/2021    Subjective:  No acute events overnight. Denies chest pain, LH, syncope. Now able to lie flat. Feels that breathing is significantly improved.      Objective:    Visit Vitals  /63 (BP 1 Location: Right upper arm, BP Patient Position: Sitting)   Pulse 88   Temp 97.8 °F (36.6 °C)   Resp 18   Ht 5' 9\" (1.753 m)   Wt 137.2 kg (302 lb 8 oz)   SpO2 95%   BMI 44.67 kg/m²         Intake/Output Summary (Last 24 hours) at 12/19/2021 1252  Last data filed at 12/18/2021 1938  Gross per 24 hour   Intake 1280 ml   Output 1900 ml   Net -620 ml        Physical Exam  GEN: NAD, appears older than stated age  HEENT: EOMI, MMM, OP clear, poor dentition with several rotten teeth  NECK: Normal JVP  CV: RRR, normal S1 and S2, no M/R/G  LUNGS: CTAB   ABD: NABS, soft, NT/ND  EXT: Trace to 1+ pitting edema in b/l ankles   PSYCH: Mood and affect normal  NEURO: AAO, MAEW, face symmetrical, speech intact    Current Facility-Administered Medications   Medication Dose Route Frequency    potassium chloride SR (KLOR-CON 10) tablet 40 mEq  40 mEq Oral BID WITH MEALS    insulin glargine (LANTUS) injection 50 Units  50 Units SubCUTAneous QHS    [START ON 12/20/2021] atorvastatin (LIPITOR) tablet 40 mg  40 mg Oral DAILY    losartan (COZAAR) tablet 25 mg  25 mg Oral QHS    furosemide (LASIX) injection 40 mg  40 mg IntraVENous ACB&D    alogliptin (NESINA) tablet 25 mg  25 mg Oral DAILY    carvediloL (COREG) tablet 12.5 mg  12.5 mg Oral BID WITH MEALS    glucose chewable tablet 16 g  4 Tablet Oral PRN    dextrose (D50W) injection syrg 12.5-25 g  25-50 mL IntraVENous PRN    glucagon (GLUCAGEN) injection 1 mg  1 mg IntraMUSCular PRN    insulin lispro (HUMALOG) injection   SubCUTAneous AC&HS       Data Reviewed:  Recent Labs     12/19/21  0341 12/18/21  0520 12/17/21  0227    137 135*   K 3.5 3.7 3.6    105 104   CO2 28 27 25   * 199* 256*   BUN 22* 20 17 CREA 1.05 1.02 1.05   CA 8.8 8.6 8.5   MG  --   --  1.8   PHOS  --   --  3.7   ALB  --   --  3.1*   ALT  --   --  109*     Recent Labs     12/19/21  0341 12/18/21  0520 12/17/21  0227   WBC 15.1* 12.4* 13.3*   HGB 14.7 14.7 14.7   HCT 43.4 42.4 42.9    254 267     No results found for: SDES  No results found for: CULT    All Cardiac Markers in the last 24 hours:  No results found for: CPK, CK, CKMMB, CKMB, RCK3, CKMBT, CKMBPOC, CKNDX, CKND1, MISAEL, TROPT, TROIQ, SIDRA, TROPT, TNIPOC, BNP, BNPP, BNPNT    Telemetry (personally reviewed): normal sinus rhythm    Assessment:  1. New diagnosis of acute combined systolic and diastolic CHF with EF 98-78%, NYHA class IV on admission   - Unclear if NICM or ICM; suspect NICM from longstanding HTN, DM2, obesity, metabolic syndrome  2. Dyspnea, likely due to #1  3. HTN  4. DM2 with other circulatory complications  5. Morbid obesity  6. Moderate/severe RV hypokinesis  7. Restrictive diastolic dysfunction  8. Mild mitral regurgitation    Plan:  - Last dose of IV furosemide this afternoon; will adjust diuretic after cardiac cath tomorrow  - Strict Is and Os, daily weights  - Continue carvedilol 12.5 mg BID  - Started losartan 25 mg qhs  - May change to [facility] if has BP room  - Will eventually try to start MRA/SGLT2i  - Will need R/LHC; plan on Monday at 08:30  - Will need LifeVest evaluation; consult placed  - Please keep NPO after MN on Sunday     Thank you for the opportunity to participate in the care of Shiloh Townsend and please do not hesitate to contact us should you have any questions. Signed:  Nadia Johnson, [address] [place] [address] / [address] [place] [address] [facility]  12/19/21

## 2021-12-20 ENCOUNTER — TRANSCRIBE ORDER (OUTPATIENT)
Dept: CARDIAC REHAB | Age: 51
End: 2021-12-20

## 2021-12-20 DIAGNOSIS — I50.22 HEART FAILURE, SYSTOLIC, CHRONIC (HCC): Primary | ICD-10-CM

## 2021-12-20 LAB
ANION GAP SERPL CALC-SCNC: 7 MMOL/L (ref 5–15)
BASOPHILS # BLD: 0.1 K/UL (ref 0–0.1)
BASOPHILS NFR BLD: 1 % (ref 0–1)
BUN SERPL-MCNC: 25 MG/DL (ref 6–20)
BUN/CREAT SERPL: 22 (ref 12–20)
CALCIUM SERPL-MCNC: 8.4 MG/DL (ref 8.5–10.1)
CHLORIDE SERPL-SCNC: 107 MMOL/L (ref 97–108)
CO2 SERPL-SCNC: 24 MMOL/L (ref 21–32)
CREAT SERPL-MCNC: 1.16 MG/DL (ref 0.7–1.3)
DIFFERENTIAL METHOD BLD: ABNORMAL
EOSINOPHIL # BLD: 0.5 K/UL (ref 0–0.4)
EOSINOPHIL NFR BLD: 3 % (ref 0–7)
ERYTHROCYTE [DISTWIDTH] IN BLOOD BY AUTOMATED COUNT: 13.2 % (ref 11.5–14.5)
GLUCOSE BLD STRIP.AUTO-MCNC: 127 MG/DL (ref 65–117)
GLUCOSE BLD STRIP.AUTO-MCNC: 135 MG/DL (ref 65–117)
GLUCOSE BLD STRIP.AUTO-MCNC: 135 MG/DL (ref 65–117)
GLUCOSE BLD STRIP.AUTO-MCNC: 146 MG/DL (ref 65–117)
GLUCOSE BLD STRIP.AUTO-MCNC: 156 MG/DL (ref 65–117)
GLUCOSE SERPL-MCNC: 142 MG/DL (ref 65–100)
HCT VFR BLD AUTO: 45.5 % (ref 36.6–50.3)
HGB BLD-MCNC: 15.2 G/DL (ref 12.1–17)
IMM GRANULOCYTES # BLD AUTO: 0.2 K/UL (ref 0–0.04)
IMM GRANULOCYTES NFR BLD AUTO: 1 % (ref 0–0.5)
LYMPHOCYTES # BLD: 4 K/UL (ref 0.8–3.5)
LYMPHOCYTES NFR BLD: 24 % (ref 12–49)
MCH RBC QN AUTO: 30 PG (ref 26–34)
MCHC RBC AUTO-ENTMCNC: 33.4 G/DL (ref 30–36.5)
MCV RBC AUTO: 89.9 FL (ref 80–99)
MONOCYTES # BLD: 1.5 K/UL (ref 0–1)
MONOCYTES NFR BLD: 9 % (ref 5–13)
NEUTS SEG # BLD: 10.7 K/UL (ref 1.8–8)
NEUTS SEG NFR BLD: 62 % (ref 32–75)
NRBC # BLD: 0 K/UL (ref 0–0.01)
NRBC BLD-RTO: 0 PER 100 WBC
PLATELET # BLD AUTO: 321 K/UL (ref 150–400)
PMV BLD AUTO: 10.4 FL (ref 8.9–12.9)
POTASSIUM SERPL-SCNC: 3.5 MMOL/L (ref 3.5–5.1)
RBC # BLD AUTO: 5.06 M/UL (ref 4.1–5.7)
SERVICE CMNT-IMP: ABNORMAL
SODIUM SERPL-SCNC: 138 MMOL/L (ref 136–145)
WBC # BLD AUTO: 17 K/UL (ref 4.1–11.1)

## 2021-12-20 PROCEDURE — 76937 US GUIDE VASCULAR ACCESS: CPT | Performed by: INTERNAL MEDICINE

## 2021-12-20 PROCEDURE — 93460 R&L HRT ART/VENTRICLE ANGIO: CPT | Performed by: INTERNAL MEDICINE

## 2021-12-20 PROCEDURE — 99153 MOD SED SAME PHYS/QHP EA: CPT | Performed by: INTERNAL MEDICINE

## 2021-12-20 PROCEDURE — 82962 GLUCOSE BLOOD TEST: CPT

## 2021-12-20 PROCEDURE — 80048 BASIC METABOLIC PNL TOTAL CA: CPT

## 2021-12-20 PROCEDURE — 74011250637 HC RX REV CODE- 250/637: Performed by: INTERNAL MEDICINE

## 2021-12-20 PROCEDURE — 36415 COLL VENOUS BLD VENIPUNCTURE: CPT

## 2021-12-20 PROCEDURE — 77030013744: Performed by: INTERNAL MEDICINE

## 2021-12-20 PROCEDURE — 4A03353 MEASUREMENT OF ARTERIAL FLOW, PULMONARY, PERCUTANEOUS APPROACH: ICD-10-PCS | Performed by: INTERNAL MEDICINE

## 2021-12-20 PROCEDURE — C1751 CATH, INF, PER/CENT/MIDLINE: HCPCS | Performed by: INTERNAL MEDICINE

## 2021-12-20 PROCEDURE — 85025 COMPLETE CBC W/AUTO DIFF WBC: CPT

## 2021-12-20 PROCEDURE — 74011636637 HC RX REV CODE- 636/637: Performed by: INTERNAL MEDICINE

## 2021-12-20 PROCEDURE — C1769 GUIDE WIRE: HCPCS | Performed by: INTERNAL MEDICINE

## 2021-12-20 PROCEDURE — C1894 INTRO/SHEATH, NON-LASER: HCPCS | Performed by: INTERNAL MEDICINE

## 2021-12-20 PROCEDURE — 77030010221 HC SPLNT WR POS TELE -B: Performed by: INTERNAL MEDICINE

## 2021-12-20 PROCEDURE — 77030019569 HC BND COMPR RAD TERU -B: Performed by: INTERNAL MEDICINE

## 2021-12-20 PROCEDURE — 77030040934 HC CATH DIAG DXTERITY MEDT -A: Performed by: INTERNAL MEDICINE

## 2021-12-20 PROCEDURE — 77030015766: Performed by: INTERNAL MEDICINE

## 2021-12-20 PROCEDURE — 74011250636 HC RX REV CODE- 250/636: Performed by: INTERNAL MEDICINE

## 2021-12-20 PROCEDURE — 74011000250 HC RX REV CODE- 250: Performed by: INTERNAL MEDICINE

## 2021-12-20 PROCEDURE — 74011636637 HC RX REV CODE- 636/637: Performed by: HOSPITALIST

## 2021-12-20 PROCEDURE — 77030013797 HC KT TRNSDUC PRSSR EDWD -A: Performed by: INTERNAL MEDICINE

## 2021-12-20 PROCEDURE — 99152 MOD SED SAME PHYS/QHP 5/>YRS: CPT | Performed by: INTERNAL MEDICINE

## 2021-12-20 PROCEDURE — 4A023N8 MEASUREMENT OF CARDIAC SAMPLING AND PRESSURE, BILATERAL, PERCUTANEOUS APPROACH: ICD-10-PCS | Performed by: INTERNAL MEDICINE

## 2021-12-20 PROCEDURE — B2111ZZ FLUOROSCOPY OF MULTIPLE CORONARY ARTERIES USING LOW OSMOLAR CONTRAST: ICD-10-PCS | Performed by: INTERNAL MEDICINE

## 2021-12-20 PROCEDURE — 65660000000 HC RM CCU STEPDOWN

## 2021-12-20 RX ORDER — SODIUM CHLORIDE 0.9 % (FLUSH) 0.9 %
5-40 SYRINGE (ML) INJECTION EVERY 8 HOURS
Status: DISCONTINUED | OUTPATIENT
Start: 2021-12-20 | End: 2021-12-23 | Stop reason: HOSPADM

## 2021-12-20 RX ORDER — HEPARIN SODIUM 1000 [USP'U]/ML
INJECTION, SOLUTION INTRAVENOUS; SUBCUTANEOUS AS NEEDED
Status: DISCONTINUED | OUTPATIENT
Start: 2021-12-20 | End: 2021-12-20 | Stop reason: HOSPADM

## 2021-12-20 RX ORDER — VERAPAMIL HYDROCHLORIDE 2.5 MG/ML
INJECTION, SOLUTION INTRAVENOUS AS NEEDED
Status: DISCONTINUED | OUTPATIENT
Start: 2021-12-20 | End: 2021-12-20 | Stop reason: HOSPADM

## 2021-12-20 RX ORDER — CARVEDILOL 6.25 MG/1
6.25 TABLET ORAL 2 TIMES DAILY WITH MEALS
Status: DISCONTINUED | OUTPATIENT
Start: 2021-12-20 | End: 2021-12-23

## 2021-12-20 RX ORDER — NALOXONE HYDROCHLORIDE 0.4 MG/ML
0.4 INJECTION, SOLUTION INTRAMUSCULAR; INTRAVENOUS; SUBCUTANEOUS AS NEEDED
Status: DISCONTINUED | OUTPATIENT
Start: 2021-12-20 | End: 2021-12-23 | Stop reason: HOSPADM

## 2021-12-20 RX ORDER — SODIUM CHLORIDE 0.9 % (FLUSH) 0.9 %
5-40 SYRINGE (ML) INJECTION AS NEEDED
Status: DISCONTINUED | OUTPATIENT
Start: 2021-12-20 | End: 2021-12-23 | Stop reason: HOSPADM

## 2021-12-20 RX ORDER — MILRINONE LACTATE 0.2 MG/ML
0.12 INJECTION, SOLUTION INTRAVENOUS CONTINUOUS
Status: DISCONTINUED | OUTPATIENT
Start: 2021-12-20 | End: 2021-12-22

## 2021-12-20 RX ORDER — LOSARTAN POTASSIUM 25 MG/1
12.5 TABLET ORAL
Status: DISCONTINUED | OUTPATIENT
Start: 2021-12-20 | End: 2021-12-23 | Stop reason: HOSPADM

## 2021-12-20 RX ORDER — LIDOCAINE HYDROCHLORIDE 10 MG/ML
INJECTION INFILTRATION; PERINEURAL AS NEEDED
Status: DISCONTINUED | OUTPATIENT
Start: 2021-12-20 | End: 2021-12-20 | Stop reason: HOSPADM

## 2021-12-20 RX ORDER — MIDAZOLAM HYDROCHLORIDE 1 MG/ML
INJECTION, SOLUTION INTRAMUSCULAR; INTRAVENOUS AS NEEDED
Status: DISCONTINUED | OUTPATIENT
Start: 2021-12-20 | End: 2021-12-20 | Stop reason: HOSPADM

## 2021-12-20 RX ORDER — FENTANYL CITRATE 50 UG/ML
INJECTION, SOLUTION INTRAMUSCULAR; INTRAVENOUS AS NEEDED
Status: DISCONTINUED | OUTPATIENT
Start: 2021-12-20 | End: 2021-12-20 | Stop reason: HOSPADM

## 2021-12-20 RX ADMIN — LOSARTAN POTASSIUM 12.5 MG: 25 TABLET, FILM COATED ORAL at 22:03

## 2021-12-20 RX ADMIN — Medication 10 ML: at 13:20

## 2021-12-20 RX ADMIN — INSULIN GLARGINE 50 UNITS: 100 INJECTION, SOLUTION SUBCUTANEOUS at 22:03

## 2021-12-20 RX ADMIN — CARVEDILOL 6.25 MG: 6.25 TABLET, FILM COATED ORAL at 17:05

## 2021-12-20 RX ADMIN — MILRINONE LACTATE IN DEXTROSE 0.12 MCG/KG/MIN: 200 INJECTION, SOLUTION INTRAVENOUS at 10:38

## 2021-12-20 RX ADMIN — Medication 2 UNITS: at 17:05

## 2021-12-20 RX ADMIN — ALOGLIPTIN 25 MG: 25 TABLET, FILM COATED ORAL at 15:48

## 2021-12-20 RX ADMIN — BUMETANIDE 1 MG/HR: 0.25 INJECTION INTRAMUSCULAR; INTRAVENOUS at 10:45

## 2021-12-20 RX ADMIN — ATORVASTATIN CALCIUM 40 MG: 40 TABLET, FILM COATED ORAL at 13:20

## 2021-12-20 RX ADMIN — Medication 10 ML: at 22:12

## 2021-12-20 RX ADMIN — BUMETANIDE 1 MG/HR: 0.25 INJECTION INTRAMUSCULAR; INTRAVENOUS at 15:47

## 2021-12-20 NOTE — PROGRESS NOTES
Transition of Care Plan:               * Disposition- Home, Independent  * Needs new PCP appointment  * Lizett Brunner evaluation  * Cardiology appointment      Reason for Admission:  Dyspnea                     RUR Score:          5%           Plan for utilizing home health:        None  PCP: First and Last name:  None Needs new PCP     Name of Practice:    Are you a current patient: Yes/No:    Approximate date of last visit:    Can you participate in a virtual visit with your PCP:                     Current Advanced Directive/Advance Care Plan: No Order  No ACP documents    Healthcare Decision Maker:   Click here to complete [address] including selection of the Healthcare Decision Maker Relationship (ie \"Primary\")           Isaac Vasquez, spouse [phone]                  Transition of Care Plan:               * Disposition- Home, Independent  * Needs new PCP appointment  * Lizett Brunner evaluation  * Cardiology appointment      Richelle Maddox is a 46 yr old male who presented to hospital due to dyspnea. Patient admitted for treatment of acute onset systolic HF and new onset diabetes. Demographics and NOK verified. Patient requested assistance with new PCP, specialist appointments. Patient reports full independence and denies use of any DME.      Care Management Interventions  PCP Verified by CM: No (Does not have)  Transition of Care Consult (CM Consult): Discharge Planning  Discharge Durable Medical Equipment: No  Health Maintenance Reviewed: Yes  Physical Therapy Consult: No  Occupational Therapy Consult: No  Speech Therapy Consult: No  Support Systems: Spouse/Significant Other  The Patient and/or Patient Representative was Provided with a Choice of Provider and Agrees with the Discharge Plan?: Yes  Name of the Patient Representative Who was Provided with a Choice of Provider and Agrees with the Discharge Plan: Patient  [address] Provided?: No  Discharge Location  Discharge Placement: [address], [place]  Case Management [address] [place] [address]  C: [phone]

## 2021-12-20 NOTE — PROGRESS NOTES
TRANSFER - IN REPORT:    Verbal report received from [facility] RN(name) on Evette Lind  being received from Room 562 lbs(unit) for routine progression of care      Report consisted of patients Situation, Background, Assessment and   Recommendations(SBAR). Information from the following report(s) SBAR, Procedure Summary, MAR, Recent Results and Cardiac Rhythm NSR was reviewed with the receiving nurse. Opportunity for questions and clarification was provided. Assessment completed upon patients arrival to unit and care assumed.

## 2021-12-20 NOTE — PROGRESS NOTES
TRANSFER - IN REPORT:    Verbal report received from SLADE Mcintyre(name) on Richard Linn  being received from Cath Lab(unit) for routine progression of care      Report consisted of patients Situation, Background, Assessment and   Recommendations(SBAR). Information from the following report(s) SBAR, Kardex, Intake/Output, MAR, Recent Results and Cardiac Rhythm SR was reviewed with the receiving nurse. Opportunity for questions and clarification was provided. Assessment completed upon patients arrival to unit and care assumed. 1930 Bedside and Verbal shift change report given to 2018 Juaquin Gage (oncoming nurse) by Nicki No (offgoing nurse).  Report included the following information SBAR, Kardex, Intake/Output, MAR, Recent Results and Cardiac Rhythm SR.

## 2021-12-20 NOTE — PROGRESS NOTES
[address] [facility] Adult  Hospitalist Group                                                                                          Hospitalist Progress Note  Gee Razo MD  Answering service: [phone] from in house phone        Date of Service:  2021  NAME:  Nelson Rose  :  1970  MRN:  [id number]      Admission Summary:     Nelson Rose is a 46 y.o. male who presents with dyspnea      51-year-old male, vaccinated for Covid, presents with complaints of 1 week dyspnea on exertion associated with fatigue.  Patient reports he [facility] night and was advised to come to the emergency department for abnormal EKG. [facility] work from [facility] significant for WBC of 14, glucose of 327.  Patient denies any history of high blood sugar, diabetes.  denied CHF, denied cad. Not taking any medications at home. Denies fever, chills, nausea, vomiting, diarrhea, constipation.  Denies chest pain.  Covid [facility] at [facility] last night pending.  Denies leg swelling, asymmetry.  Denies hemoptysis.  Denies history of DVT/PE.  Denies any recent immobilization/trauma.   In ER, noticed dyspnea after mild exertion, and difficulty to lie flat due to sob           Interval history / Subjective:   Reports significant improvement in shortness of breath after his Lasix was increased to 40 mg twice daily  Reports his orthopnea is improving significantly as well     Assessment & Plan:     Acute new onset systolic congestive heart failure POA  -Patient presented with shortness of breath, noted to have elevated proBNP and clinical volume overload  -Does not have a history of CHF  -Started on Lasix 40 mg IV daily and increased to 40 mg IV twice daily   -Total I's and O's documented -12 L since admission  -Echocardiogram showed EF of 15 to 20%  -Continue Coreg and losartan started on this admission  -Status post right and left heart catheterization, no significant coronary artery disease noticed but was noticed to have elevated right-sided pressures  -Patient started on milrinone and Bumex drip now  -Patient's clinical volume status is improving  -We will also defer starting Entresto by cardiology at this point  -Patient will need a LifeVest on discharge  -He also has elevated leukocytosis with unclear etiology, will check ESR and CRP to evaluate for myocarditis    New onset diabetes with hyperglycemia:  -Hemoglobin A1c is 10, blood sugars remain elevated  -Started on Lantus 30 units daily with alogliptin 25 units daily  -Increase Lantus to 50 units daily 12/19, blood sugars better controlled  -May introduce Metformin on discharge  -Continue sliding scale insulin  -Patient will benefit from SGLT2 inhibitors as an outpatient due to presence of congestive heart failure  -Diabetes education appreciated    Hypertension:  -Blood pressure better controlled with Coreg and losartan  -Continue  for now  -Patient likely will have to be introduced with Entresto due to low EF  -Cardiology following    History of brain aneurysm:  -Status post clipping in  1997      Code status: Full code  DVT prophylaxis: Not needed, patient is up and ambulatory    Care Plan discussed with: Patient/Family  Anticipated Disposition: Home w/Family  Anticipated Discharge: 24 hours to 48 hours     Hospital Problems  Never Reviewed          Codes Class Noted POA    Dyspnea ICD-10-CM: R06.00  ICD-9-CM: 786.09  12/16/2021 Unknown        CHF (congestive [facility] failure) ([facility] ICD-10-CM: I50.9  ICD-9-CM: 428.0  12/16/2021 Unknown                Review of Systems:   A comprehensive review of systems was negative except for that written in the HPI. Vital Signs:    Last 24hrs VS reviewed since prior progress note.  Most recent are:  Visit Vitals  BP (!) 135/92 (BP 1 Location: Right arm, BP Patient Position: At rest)   Pulse 91   Temp 97.8 °F (36.6 °C)   Resp 18   Ht 5' 9\" (1.753 m)   Wt 137.2 kg (302 lb 8 oz) SpO2 95%   BMI 44.67 kg/m²         Intake/Output Summary (Last 24 hours) at 12/20/2021 1435  Last data filed at 12/20/2021 1200  Gross per 24 hour   Intake 120 ml   Output 1730 ml   Net -1610 ml        Physical Examination:     I had a face to face encounter with this patient and independently examined them on 12/20/2021 as outlined below:          Constitutional:  No acute distress, cooperative, pleasant    ENT:  Oral mucosa moist, oropharynx benign. Resp:  CTA bilaterally. No wheezing/rhonchi/rales. No accessory muscle use   CV:  Regular rhythm, normal rate, no murmurs, gallops, rubs    GI:  Soft, non distended, non tender. normoactive bowel sounds, no hepatosplenomegaly     Musculoskeletal:  No edema, warm, 2+ pulses throughout    Neurologic:  Moves all extremities. AAOx3, CN II-XII reviewed            Data Review:    Review and/or order of clinical lab test      Labs:     Recent Labs     12/20/21  0112 12/19/21  0341   WBC 17.0* 15.1*   HGB 15.2 14.7   HCT 45.5 43.4    270     Recent Labs     12/20/21  0112 12/19/21  0341 12/18/21  0520    139 137   K 3.5 3.5 3.7    104 105   CO2 24 28 27   BUN 25* 22* 20   CREA 1.16 1.05 1.02   * 153* 199*   CA 8.4* 8.8 8.6     No results for input(s): ALT, AP, TBIL, TBILI, TP, ALB, GLOB, GGT, AML, LPSE in the last 72 hours. No lab exists for component: SGOT, GPT, AMYP, HLPSE  No results for input(s): INR, PTP, APTT, INREXT, INREXT in the last 72 hours. No results for input(s): FE, TIBC, PSAT, FERR in the last 72 hours. No results found for: FOL, RBCF   No results for input(s): PH, PCO2, PO2 in the last 72 hours. No results for input(s): CPK, CKNDX, TROIQ in the last 72 hours.     No lab exists for component: CPKMB  No results found for: CHOL, CHOLX, CHLST, CHOLV, HDL, HDLP, LDL, LDLC, DLDLP, TGLX, TRIGL, TRIGP, CHHD, CHHDX  Lab Results   Component Value Date/Time    Glucose (POC) 135 (H) 12/20/2021 01:20 PM    Glucose (POC) 127 (H) 12/20/2021 08:13 AM    Glucose (POC) 146 (H) 12/20/2021 06:49 AM    Glucose (POC) 169 (H) 12/19/2021 09:38 PM    Glucose (POC) 125 (H) 12/19/2021 04:28 PM     Lab Results   Component Value Date/Time    Color YELLOW 09/19/2010 06:04 AM    Appearance CLEAR 09/19/2010 06:04 AM    Specific gravity 1.023 09/19/2010 06:04 AM    pH (UA) 5.0 09/19/2010 06:04 AM    Protein NEGATIVE  09/19/2010 06:04 AM    Glucose NEGATIVE  09/19/2010 06:04 AM    Ketone NEGATIVE  09/19/2010 06:04 AM    Bilirubin NEGATIVE  09/19/2010 06:04 AM    Urobilinogen 0.2 09/19/2010 06:04 AM    Nitrites NEGATIVE  09/19/2010 06:04 AM    Leukocyte Esterase NEGATIVE  09/19/2010 06:04 AM         Medications Reviewed:     Current Facility-Administered Medications   Medication Dose Route Frequency    carvediloL (COREG) tablet 6.25 mg  6.25 mg Oral BID WITH MEALS    milrinone (PRIMACOR) 20 MG/100 ML D5W infusion  0.125 mcg/kg/min IntraVENous CONTINUOUS    bumetanide (BUMEX) 0.25 mg/mL infusion  1 mg/hr IntraVENous CONTINUOUS    losartan (COZAAR) tablet 12.5 mg  12.5 mg Oral QHS    sodium chloride (NS) flush 5-40 mL  5-40 mL IntraVENous Q8H    sodium chloride (NS) flush 5-40 mL  5-40 mL IntraVENous PRN    naloxone (NARCAN) injection 0.4 mg  0.4 mg IntraVENous PRN    insulin glargine (LANTUS) injection 50 Units  50 Units SubCUTAneous QHS    atorvastatin (LIPITOR) tablet 40 mg  40 mg Oral DAILY    alogliptin (NESINA) tablet 25 mg  25 mg Oral DAILY    glucose chewable tablet 16 g  4 Tablet Oral PRN    dextrose (D50W) injection syrg 12.5-25 g  25-50 mL IntraVENous PRN    glucagon (GLUCAGEN) injection 1 mg  1 mg IntraMUSCular PRN    insulin lispro (HUMALOG) injection   SubCUTAneous AC&HS     ______________________________________________________________________  EXPECTED LENGTH OF STAY: - - -  ACTUAL LENGTH OF STAY:          4                 Christiano Sims MD

## 2021-12-20 NOTE — PROGRESS NOTES
Bedside and Verbal shift change report given to SLADE Clarke (oncoming nurse) by Desi Duncan RN (offgoing nurse). Report included the following information SBAR.

## 2021-12-20 NOTE — PROGRESS NOTES
TRANSFER - OUT REPORT:    Verbal report given to Nikki JUNE on Brandon Guerrero being transferred to [address] [place] [address] for routine progression of care       Report consisted of patients Situation, Background, Assessment and   Recommendations(SBAR). Information from the following report(s) SBAR, Procedure Summary, MAR, Recent Results and Cardiac Rhythm NSR was reviewed with the receiving nurse. Opportunity for questions and clarification was provided.

## 2021-12-20 NOTE — PROGRESS NOTES
TRANSFER - IN REPORT:    Verbal report received from [facility] on Sohail Hernandez  being received from procedure area for routine progression of care. Report consisted of patients Situation, Background, Assessment and Recommendations(SBAR). Information from the following report(s) SBAR, Procedure Summary, MAR, Recent Results and Cardiac Rhythm NSR was reviewed with the receiving clinician. Opportunity for questions and clarification was provided. Assessment completed upon patients arrival to [address] [place] [address] and care assumed. Cardiac Cath Lab Recovery Arrival Note:    Sohail Hernandez arrived to [facility] recovery area. Patient procedure= LHC/RHC. Patient on cardiac monitor, non-invasive blood pressure, SPO2 monitor. On Room air. IV  of NS on pump at 25 ml/hr. Patient status doing well without problems. Patient is A&Ox 4. Patient reports No Pain. PROCEDURE SITE CHECK:    Procedure site:without any bleeding and No Hematoma, No pain/discomfort reported at procedure site. No change in patient status. Continue to monitor patient and status.

## 2021-12-20 NOTE — CARDIO/PULMONARY
Cardiac Rehab: Living with Heart Failure Booklet given to Ava Ruiz. Visited to begin HF education and to discuss Cardiac Rehab. Educated using teach back method. Discussed diagnosis definition and assessed patient understanding. Reviewed importance of daily weight monitoring and Low Sodium diet (2068-7887 mg. Daily) as well as medication compliance. Encouraged activity and rest periods within symptom limitations and as ordered by physician. .     Discussed the Cardiac Rehab Program, benefits, format, and encouraged enrollment, when eligible. Ava Ruiz declined because he lives here in [place] but commutes to [facility] for work beginning his workday by 3 am and getting in after 7 pm.A referral was placed and [facility] contact information is on his AVS but doubtful he will enroll. Calvin Tapia RN  .

## 2021-12-20 NOTE — PROGRESS NOTES
Report given to Cath lab RN. She stated to give the insulin when he comes back from the cath lab when he can eat again.

## 2021-12-21 LAB
ALBUMIN SERPL-MCNC: 3.6 G/DL (ref 3.5–5)
ALBUMIN/GLOB SERPL: 1.1 {RATIO} (ref 1.1–2.2)
ALP SERPL-CCNC: 79 U/L (ref 45–117)
ALT SERPL-CCNC: 130 U/L (ref 12–78)
ANION GAP SERPL CALC-SCNC: 8 MMOL/L (ref 5–15)
ANION GAP SERPL CALC-SCNC: 9 MMOL/L (ref 5–15)
AST SERPL-CCNC: 41 U/L (ref 15–37)
BACTERIA SPEC CULT: NORMAL
BASOPHILS # BLD: 0.1 K/UL (ref 0–0.1)
BASOPHILS NFR BLD: 1 % (ref 0–1)
BILIRUB SERPL-MCNC: 1.5 MG/DL (ref 0.2–1)
BUN SERPL-MCNC: 18 MG/DL (ref 6–20)
BUN SERPL-MCNC: 20 MG/DL (ref 6–20)
BUN/CREAT SERPL: 15 (ref 12–20)
BUN/CREAT SERPL: 17 (ref 12–20)
CALCIUM SERPL-MCNC: 8.5 MG/DL (ref 8.5–10.1)
CALCIUM SERPL-MCNC: 9.1 MG/DL (ref 8.5–10.1)
CHLORIDE SERPL-SCNC: 98 MMOL/L (ref 97–108)
CHLORIDE SERPL-SCNC: 98 MMOL/L (ref 97–108)
CO2 SERPL-SCNC: 28 MMOL/L (ref 21–32)
CO2 SERPL-SCNC: 31 MMOL/L (ref 21–32)
CREAT SERPL-MCNC: 1.05 MG/DL (ref 0.7–1.3)
CREAT SERPL-MCNC: 1.3 MG/DL (ref 0.7–1.3)
DIFFERENTIAL METHOD BLD: ABNORMAL
EOSINOPHIL # BLD: 0.4 K/UL (ref 0–0.4)
EOSINOPHIL NFR BLD: 2 % (ref 0–7)
ERYTHROCYTE [DISTWIDTH] IN BLOOD BY AUTOMATED COUNT: 13.2 % (ref 11.5–14.5)
GLOBULIN SER CALC-MCNC: 3.3 G/DL (ref 2–4)
GLUCOSE BLD STRIP.AUTO-MCNC: 106 MG/DL (ref 65–117)
GLUCOSE BLD STRIP.AUTO-MCNC: 121 MG/DL (ref 65–117)
GLUCOSE BLD STRIP.AUTO-MCNC: 172 MG/DL (ref 65–117)
GLUCOSE BLD STRIP.AUTO-MCNC: 258 MG/DL (ref 65–117)
GLUCOSE SERPL-MCNC: 128 MG/DL (ref 65–100)
GLUCOSE SERPL-MCNC: 91 MG/DL (ref 65–100)
HCT VFR BLD AUTO: 46.3 % (ref 36.6–50.3)
HGB BLD-MCNC: 15.9 G/DL (ref 12.1–17)
IMM GRANULOCYTES # BLD AUTO: 0.2 K/UL (ref 0–0.04)
IMM GRANULOCYTES NFR BLD AUTO: 1 % (ref 0–0.5)
LYMPHOCYTES # BLD: 2.3 K/UL (ref 0.8–3.5)
LYMPHOCYTES NFR BLD: 15 % (ref 12–49)
MCH RBC QN AUTO: 30.3 PG (ref 26–34)
MCHC RBC AUTO-ENTMCNC: 34.3 G/DL (ref 30–36.5)
MCV RBC AUTO: 88.4 FL (ref 80–99)
MONOCYTES # BLD: 1.6 K/UL (ref 0–1)
MONOCYTES NFR BLD: 11 % (ref 5–13)
NEUTS SEG # BLD: 10.9 K/UL (ref 1.8–8)
NEUTS SEG NFR BLD: 70 % (ref 32–75)
NRBC # BLD: 0 K/UL (ref 0–0.01)
NRBC BLD-RTO: 0 PER 100 WBC
PLATELET # BLD AUTO: 321 K/UL (ref 150–400)
PMV BLD AUTO: 10.2 FL (ref 8.9–12.9)
POTASSIUM SERPL-SCNC: 2.8 MMOL/L (ref 3.5–5.1)
POTASSIUM SERPL-SCNC: 3.4 MMOL/L (ref 3.5–5.1)
PROT SERPL-MCNC: 6.9 G/DL (ref 6.4–8.2)
RBC # BLD AUTO: 5.24 M/UL (ref 4.1–5.7)
SERVICE CMNT-IMP: ABNORMAL
SERVICE CMNT-IMP: NORMAL
SERVICE CMNT-IMP: NORMAL
SODIUM SERPL-SCNC: 134 MMOL/L (ref 136–145)
SODIUM SERPL-SCNC: 138 MMOL/L (ref 136–145)
WBC # BLD AUTO: 15.4 K/UL (ref 4.1–11.1)

## 2021-12-21 PROCEDURE — 85025 COMPLETE CBC W/AUTO DIFF WBC: CPT

## 2021-12-21 PROCEDURE — 74011000250 HC RX REV CODE- 250: Performed by: INTERNAL MEDICINE

## 2021-12-21 PROCEDURE — 80053 COMPREHEN METABOLIC PANEL: CPT

## 2021-12-21 PROCEDURE — 74011250637 HC RX REV CODE- 250/637: Performed by: INTERNAL MEDICINE

## 2021-12-21 PROCEDURE — 82962 GLUCOSE BLOOD TEST: CPT

## 2021-12-21 PROCEDURE — 74011636637 HC RX REV CODE- 636/637: Performed by: HOSPITALIST

## 2021-12-21 PROCEDURE — 74011636637 HC RX REV CODE- 636/637: Performed by: INTERNAL MEDICINE

## 2021-12-21 PROCEDURE — 36415 COLL VENOUS BLD VENIPUNCTURE: CPT

## 2021-12-21 PROCEDURE — 74011250636 HC RX REV CODE- 250/636: Performed by: INTERNAL MEDICINE

## 2021-12-21 PROCEDURE — 65660000000 HC RM CCU STEPDOWN

## 2021-12-21 PROCEDURE — 99233 SBSQ HOSP IP/OBS HIGH 50: CPT | Performed by: CLINICAL NURSE SPECIALIST

## 2021-12-21 RX ORDER — POTASSIUM CHLORIDE 7.45 MG/ML
10 INJECTION INTRAVENOUS
Status: COMPLETED | OUTPATIENT
Start: 2021-12-21 | End: 2021-12-21

## 2021-12-21 RX ORDER — POTASSIUM CHLORIDE 750 MG/1
40 TABLET, FILM COATED, EXTENDED RELEASE ORAL
Status: COMPLETED | OUTPATIENT
Start: 2021-12-21 | End: 2021-12-21

## 2021-12-21 RX ADMIN — POTASSIUM CHLORIDE 10 MEQ: 7.46 INJECTION, SOLUTION INTRAVENOUS at 12:20

## 2021-12-21 RX ADMIN — Medication 5 UNITS: at 11:28

## 2021-12-21 RX ADMIN — BUMETANIDE 1 MG/HR: 0.25 INJECTION INTRAMUSCULAR; INTRAVENOUS at 20:35

## 2021-12-21 RX ADMIN — MILRINONE LACTATE IN DEXTROSE 0.12 MCG/KG/MIN: 200 INJECTION, SOLUTION INTRAVENOUS at 22:14

## 2021-12-21 RX ADMIN — CARVEDILOL 6.25 MG: 6.25 TABLET, FILM COATED ORAL at 08:49

## 2021-12-21 RX ADMIN — INSULIN GLARGINE 50 UNITS: 100 INJECTION, SOLUTION SUBCUTANEOUS at 22:14

## 2021-12-21 RX ADMIN — POTASSIUM CHLORIDE 10 MEQ: 7.46 INJECTION, SOLUTION INTRAVENOUS at 14:51

## 2021-12-21 RX ADMIN — POTASSIUM CHLORIDE 10 MEQ: 7.46 INJECTION, SOLUTION INTRAVENOUS at 13:31

## 2021-12-21 RX ADMIN — ATORVASTATIN CALCIUM 40 MG: 40 TABLET, FILM COATED ORAL at 08:49

## 2021-12-21 RX ADMIN — POTASSIUM CHLORIDE 40 MEQ: 750 TABLET, FILM COATED, EXTENDED RELEASE ORAL at 16:39

## 2021-12-21 RX ADMIN — POTASSIUM CHLORIDE 10 MEQ: 7.46 INJECTION, SOLUTION INTRAVENOUS at 16:11

## 2021-12-21 RX ADMIN — Medication 10 ML: at 07:18

## 2021-12-21 RX ADMIN — BUMETANIDE 1 MG/HR: 0.25 INJECTION INTRAMUSCULAR; INTRAVENOUS at 04:04

## 2021-12-21 RX ADMIN — ALOGLIPTIN 25 MG: 25 TABLET, FILM COATED ORAL at 08:49

## 2021-12-21 RX ADMIN — POTASSIUM CHLORIDE 40 MEQ: 750 TABLET, FILM COATED, EXTENDED RELEASE ORAL at 11:28

## 2021-12-21 RX ADMIN — CARVEDILOL 6.25 MG: 6.25 TABLET, FILM COATED ORAL at 16:39

## 2021-12-21 RX ADMIN — MILRINONE LACTATE IN DEXTROSE 0.12 MCG/KG/MIN: 200 INJECTION, SOLUTION INTRAVENOUS at 04:05

## 2021-12-21 RX ADMIN — LOSARTAN POTASSIUM 12.5 MG: 25 TABLET, FILM COATED ORAL at 22:15

## 2021-12-21 RX ADMIN — Medication 10 ML: at 14:54

## 2021-12-21 NOTE — PROGRESS NOTES
Transitions of Care Plan   RUR: 4% - low   Clinical Update: new CHF diagnosis; low EF; inotrope gtt    Consults: Cardiology   Baseline: independent without DME   Barrier(s) to Discharge: medical; DME - [facility] - need order   Disposition: home with DME - [facility]   Estimated Discharge Date: 12/25/21    CM received call from Cardiologist MD - [address] consults placed for DME (Neil Chen) and PCP follow up.     1245 - Chart reviewed - consult noted for Neil Chen; however no DME order for Wearable Defibrillator in the system. CM spoke with Geena Wilson who will speak with Dr. Saritha Elder regarding electronic DME order for [address] ordered through [address] [employer]. EPIFANIO will continue to follow.     Sadia Carvajal, MPH  Care Manager [facility]  Available via [address] [place] [address] or

## 2021-12-21 NOTE — PROGRESS NOTES
[facility] Cardiology Progress Note    Date of Service: 12/21/2021    Subjective:  No acute events overnight. Excellent diuresis overnight on milrinone drip and bumetanide drip. Feels that breathing continues to improve. No orthopnea. Minimal ankle edema.     Objective:    Visit Vitals  /70 (BP 1 Location: Right arm, BP Patient Position: At rest)   Pulse 89   Temp 98 °F (36.7 °C)   Resp 18   Ht 5' 9\" (1.753 m)   Wt 127.5 kg (281 lb 1.4 oz)   SpO2 94%   BMI 41.51 kg/m²         Intake/Output Summary (Last 24 hours) at 12/21/2021 1146  Last data filed at 12/21/2021 1118  Gross per 24 hour   Intake 1640 ml   Output 7970 ml   Net -6330 ml        Physical Exam  GEN: NAD, appears older than stated age  HEENT: EOMI, MMM, OP clear, poor dentition with several rotten teeth  NECK: Normal JVP  CV: RRR, normal S1 and S2, no M/R/G  LUNGS: CTAB   ABD: NABS, soft, NT/ND  EXT: Trace pitting edema in b/l ankles   PSYCH: Mood and affect normal  NEURO: AAO, MAEW, face symmetrical, speech intact    Current Facility-Administered Medications   Medication Dose Route Frequency    potassium chloride SR (KLOR-CON 10) tablet 40 mEq  40 mEq Oral TID WITH MEALS    potassium chloride 10 mEq in 100 ml IVPB  10 mEq IntraVENous Q1H    carvediloL (COREG) tablet 6.25 mg  6.25 mg Oral BID WITH MEALS    milrinone (PRIMACOR) 20 MG/100 ML D5W infusion  0.125 mcg/kg/min IntraVENous CONTINUOUS    bumetanide (BUMEX) 0.25 mg/mL infusion  1 mg/hr IntraVENous CONTINUOUS    losartan (COZAAR) tablet 12.5 mg  12.5 mg Oral QHS    sodium chloride (NS) flush 5-40 mL  5-40 mL IntraVENous Q8H    sodium chloride (NS) flush 5-40 mL  5-40 mL IntraVENous PRN    naloxone (NARCAN) injection 0.4 mg  0.4 mg IntraVENous PRN    insulin glargine (LANTUS) injection 50 Units  50 Units SubCUTAneous QHS    atorvastatin (LIPITOR) tablet 40 mg  40 mg Oral DAILY    alogliptin (NESINA) tablet 25 mg  25 mg Oral DAILY    glucose chewable tablet 16 g  4 Tablet Oral PRN  dextrose (D50W) injection syrg 12.5-25 g  25-50 mL IntraVENous PRN    glucagon (GLUCAGEN) injection 1 mg  1 mg IntraMUSCular PRN    insulin lispro (HUMALOG) injection   SubCUTAneous AC&HS       Data Reviewed:  Recent Labs     12/21/21 0422 12/20/21 0112 12/19/21 0341    138 139   K 2.8* 3.5 3.5   CL 98 107 104   CO2 31 24 28   GLU 91 142* 153*   BUN 18 25* 22*   CREA 1.05 1.16 1.05   CA 8.5 8.4* 8.8     Recent Labs     12/21/21 0422 12/20/21 0112 12/19/21  0341   WBC 15.4* 17.0* 15.1*   HGB 15.9 15.2 14.7   HCT 46.3 45.5 43.4    321 270     No results found for: SDES  Lab Results   Component Value Date/Time    Culture result: NO GROWTH 5 DAYS 12/16/2021 12:34 PM       All Cardiac Markers in the last 24 hours:  No results found for: CPK, CK, CKMMB, CKMB, RCK3, CKMBT, CKMBPOC, CKNDX, CKND1, MISAEL, TROPT, TROIQ, SIDRA, TROPT, TNIPOC, BNP, BNPP, BNPNT    Telemetry (personally reviewed): normal sinus rhythm    Assessment:  1. New diagnosis of acute combined systolic and diastolic CHF with EF 87-47%, NYHA class IV on admission   - Unclear if NICM or ICM; suspect NICM from longstanding HTN, DM2, obesity, metabolic syndrome  2. Dyspnea, likely due to #1  3. HTN  4. DM2 with other circulatory complications  5. Morbid obesity  6. Moderate/severe RV hypokinesis  7. Restrictive diastolic dysfunction  8. Mild mitral regurgitation    Plan:  - Continue milrinone 0.125 mcg/kg/min today; will likely stop tomorrow  - Continue bumetanide drip at 1 mg/min; will transition to oral diuretics tomorrow  - Strict Is and Os, daily weights  - Continue carvedilol 6.25 mg BID  - Started losartan 12.5 mg qhs  - May change to [facility] if has BP room  - Will eventually try to start MRA/SGLT2i  - Will need [facility] evaluation; consult placed  - Supplement electrolytes    Hopeful discharge by the end of the week.     Thank you for the opportunity to participate in the care of [place] and please do not hesitate to contact us should you have any questions. Signed:  Jackson Weaver.  Pascual Arnold, [facility] / [address] [place], [place] [address]a [facility]  12/21/21

## 2021-12-21 NOTE — DIABETES MGMT
[address] [facility]    CLINICAL NURSE SPECIALIST CONSULT     Initial Presentation   Gautam Martinez is a 46 y.o. male admitted 12/16/21 with a 1 week c/o JAFFE with fatigue. Had abnormal EKG at urgent care and was told to come to ED. Labs in the ED were significant for , BNP 1933, WBC 14.9. HX: HTN    INITIAL DX:   Dyspnea [R06.00]  CHF (congestive heart failure) ([address] [I50.9]     Current Treatment     TX: ECHO/ CTA chest, Specialty consultations: cardiology and diabetes health    Consulted by Provider for advanced diabetes nursing assessment and care for:   [x] Inpatient management strategy  [x] Home management assessment  [x] Survival skill education    Hospital Course   Clinical progress has been complicated by new diagnosis of diabetes and HF  12/16: Admission  12/18: Seen by cardiology. ECHO with LV hypokinesis, EF 15-20%  12/20: Cardiac cath, no significant CAD but with elevated R side pressures  Diabetes History   New on set diabetes-     Diabetes-related Medical History  Acute complications  hyperglycemia  Neurological complications  none  Microvascular disease  none  Macrovascular disease  none  Other associated conditions     HF-new onset    Diabetes Medication History  Key Antihyperglycemic Medications     Patient is on no antihyperglycemic meds. Diabetes self-management practices:   Eating pattern- followed keto diet 6-7 yrs and lost over 140lbs and then it got to be too much and has put some of that lost weight back on.        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 Conversant and cooperative  Neuro  Alert, oriented   Vital Signs   Visit Vitals  /70 (BP 1 Location: Right arm, BP Patient Position: At rest)   Pulse 86   Temp 98 °F (36.7 °C)   Resp 18   Ht 5' 9\" (1.753 m)   Wt 127.5 kg (281 lb 1.4 oz)   SpO2 94%   BMI 41.51 kg/m²     Skin  Warm and dry. Heart   Regular rate and rhythm. No murmurs, rubs or gallops  Lungs  Clear to auscultation without rales or rhonchi  Extremities No foot wounds    Diabetic foot exam:    Left Foot     Visual Exam: normal    Pulse DP: 2+ (normal)   Filament test: normal sensation      Right Foot   Visual Exam: normal    Pulse DP: 2+ (normal)   Filament test: normal sensation     DP & PT pulses +2.      Laboratory  Recent Labs     21  0422 21  0112 21  0341   GLU 91 142* 153*   AGAP 9 7 7   WBC 15.4* 17.0* 15.1*   CREA 1.05 1.16 1.05   GFRNA >60 >60 >60       Factors impacting BG management  Factor Dose Comments   Nutrition:  Standard meals     60 grams/meal      Other:   Kidney function  Liver function     GFR >60      Blood glucose pattern      Significant diabetes-related events over the past 24-72 hours  Admitting /A1C 10%  Basal: 50 units Lantus once daily  Correctional: 7 units in the last 24h  Bolus: None  Alogliptin: 25mg daily  Fasting B  Pre-prandial B-258    Assessment and Plan   Nursing Diagnosis Risk for unstable blood glucose pattern   Nursing Intervention Domain 5250 Decision-making Support   Nursing Interventions Examined current inpatient diabetes/blood glucose control   Explored factors facilitating and impeding inpatient management  Explored corrective strategies with patient and responsible inpatient provider   Informed patient of rational for insulin strategy while hospitalized     Nursing Diagnosis 09930 Ineffective Health Management   Nursing Intervention Domain 12 Decision-makingSupport   Nursing Interventions Identified diabetes self-management practices impeding diabetes control  Discussed diabetes survival skills related to  1. Healthy Plate eating plan; given handouts  2. Role of physical activity in improving insulin sensitivity and action  3. Procedure for blood glucose monitoring & options for ow-cost products available from [facility]   4. Medications plan at discharge       Evaluation   Chika Sherman is a 46year old pleasant gentleman, with no previous history of diabetes, presented to the ED with a 1 week c/o shortness of breath and fatigue and found to have new onset combined systolic and diastolic congestive heart failure with EF 15-20%. IV diuresis and inotropes have been initiated for which he has had excellent response to. His glucose was also noted to be elevated on admission at 297 and A1C resulted at 10% indicating new diagnosis of Type 2 Diabetes. On interview, patient reports increased thirst and urination over the last several weeks but denies blurry vision, numbness/tingling in hands/feet. Basal/correctional insulin and 25mg alogliptin daily have been started during admission for which he had good response to. Lantus was started at low dose 30 units once daily and titrated to high dose 50units once daily. As he has been diuresed, his appetite has improved and now with pre-prandial hyperglycemia. He would benefit from a basal dose reduction and adding pre-meal humalog as oral intake improved. Please adjust to an inpatient glucose goal of 100-180mg/dl. Given his dx of heart failure, he would benefit from using a glucodiuretic, jardiance, as opposed to alogliptin on discharge. Recommendations   1. POC glucose ACHS    2. Consistent carbohydrate diet (60 grams CHO/meal)     3. D/C Alogliptin- little overall A1C benefit and will need to be on an SGLT2I with his HF dx on discharge    4.  Adjust the Subcutaneous Insulin Order set (7617): Insulin Dosing Specific recommendation   Basal                                     (Based on weight, BMI & GFR)   Reduce Lantus to 40 units daily  (Moderate Dose 0.3units/kg/day)      ADD pre meal Nutritional                   (Based on CHO/dextrose load) [x] Normal sensitivity  6units TID Humalog      Corrective                          (Useful in adjusting insulin dosing) [x] Normal sensitivity: ACHS        Please encourage patient to participate in diabetes self care while in the hospital including allowing patient to use lancet for blood glucose monitoring and self-administer insulin injections. Discharge Planning   1. Will need a FUV with PCP within 1-2 weeks after hospital discharge for ongoing diabetes management. Would recommend establishing care with endocrinology and will send a note to our clinic to schedule. 2. Start Metformin XR 500mg BID    3. Start Lantus PEN: Dose TBD    4. Pen Needle, Diabetic 32 Gauge x 1/4\" (1 box)    5. Start Jardiance 10mg once daily    6. Prescription for glucometer kit (Meter, Lancets (100), Strips (100)). Patient to obtain a blood glucose reading four times daily. First thing in the morning prior to eating and drinking anything then before lunch, dinner and bedtime. Create a log and present to PCP for interpretation. 7. On Discharge, please place an outpatient order for \"diabetes education\" (enter as REF20). This will trigger a referral for the Program for Diabetes Health which includes outpatient diabetes self management training with a certified diabetes educator. Billing Code(s)   73934    Before making these care recommendations, I personally reviewed the hospitalization record, including notes, laboratory & diagnostic data and current medications, and examined the patient at the bedside (circumstances permitting) before making care recommendations.      Total minutes: 39    MANSOOR Armenta  Diabetes Clinical Nurse Specialist  [facility] Diabetes [facility]  Access via [address] [place] [address]

## 2021-12-21 NOTE — PROGRESS NOTES
[address] [facility] Adult  Hospitalist Group                                                                                          Hospitalist Progress Note  Joe Rojas MD  Answering service: [phone] from in house phone        Date of Service:  2021  NAME:  Jude Powell  :  1970  MRN:  [id number]      Admission Summary:     Jude Powell is a 46 y.o. male who presents with dyspnea      61-year-old male, vaccinated for Covid, presents with complaints of 1 week dyspnea on exertion associated with fatigue.  Patient reports he [facility] night and was advised to come to the emergency department for abnormal EKG. 3 [facility] work from [facility] significant for WBC of 14, glucose of 327.  Patient denies any history of high blood sugar, diabetes.  denied CHF, denied cad. Not taking any medications at home. Denies fever, chills, nausea, vomiting, diarrhea, constipation.  Denies chest pain.  Covid test at [facility] last night pending.  Denies leg swelling, asymmetry.  Denies hemoptysis.  Denies history of DVT/PE.  Denies any recent immobilization/trauma.   In ER, noticed dyspnea after mild exertion, and difficulty to lie flat due to sob           Interval history / Subjective:   Reports significant improvement in shortness of breath after his Lasix was increased to 40 mg twice daily  Reports his orthopnea is improving significantly as well  Patient has lost 22 pounds weight in 4 days, fluid balance is documented at -20 L since admission     Assessment & Plan:     Acute new onset systolic congestive heart failure POA  -Patient presented with shortness of breath, noted to have elevated proBNP and clinical volume overload  -Does not have a history of CHF  -Started on Lasix 40 mg IV daily and increased to 40 mg IV twice daily   -Placed on Bumex drip  with milrinone drip  -Total I's and O's documented 20 L since admission  -Echocardiogram showed EF of 15 to 20%  -Continue Coreg and losartan started on this admission  -Status post right and left heart catheterization, no significant coronary artery disease noticed but was noticed to have elevated right-sided pressures  -Continue IV Bumex and milrinone per cardiology  -Patient's clinical volume status is improving  -We will also defer starting Entresto by cardiology at this point  -Patient will need a LifeVest on discharge  -He also has elevated leukocytosis with unclear etiology, will check ESR and CRP to evaluate for myocarditis    New onset diabetes with hyperglycemia:  -Hemoglobin A1c is 10, blood sugars remain elevated  -Started on Lantus 30 units daily with alogliptin 25 units daily  -Increase Lantus to 50 units daily 12/19, blood sugars better controlled  -May introduce Metformin on discharge  -Continue sliding scale insulin  -Patient will benefit from SGLT2 inhibitors as an outpatient due to presence of congestive heart failure  -Diabetes education appreciated    Hypertension:  -Blood pressure better controlled with Coreg and losartan  -Continue  for now  -Patient likely will have to be introduced with Entresto due to low EF  -Cardiology following    History of brain aneurysm:  -Status post clipping in  1997      Code status: Full code  DVT prophylaxis: Not needed, patient is up and ambulatory    Care Plan discussed with: Patient/Family  Anticipated Disposition: Home w/Family  Anticipated Discharge: 24 hours to 48 hours     Hospital Problems  Never Reviewed          Codes Class Noted POA    Dyspnea ICD-10-CM: R06.00  ICD-9-CM: 786.09  12/16/2021 Unknown        CHF (congestive heart failure) ([facility] [address] ICD-10-CM: I50.9  ICD-9-CM: 428.0  12/16/2021 Unknown                Review of Systems:   A comprehensive review of systems was negative except for that written in the HPI. Vital Signs:    Last 24hrs VS reviewed since prior progress note.  Most recent are:  Visit Vitals  /70 (BP 1 Location: Right arm, BP Patient Position: At rest)   Pulse 88   Temp 98 °F (36.7 °C)   Resp 18   Ht 5' 9\" (1.753 m)   Wt 127.5 kg (281 lb 1.4 oz)   SpO2 94%   BMI 41.51 kg/m²         Intake/Output Summary (Last 24 hours) at 12/21/2021 1401  Last data filed at 12/21/2021 1118  Gross per 24 hour   Intake 1640 ml   Output 7170 ml   Net -5530 ml        Physical Examination:     I had a face to face encounter with this patient and independently examined them on 12/21/2021 as outlined below:          Constitutional:  No acute distress, cooperative, pleasant    ENT:  Oral mucosa moist, oropharynx benign. Resp:  CTA bilaterally. No wheezing/rhonchi/rales. No accessory muscle use   CV:  Regular rhythm, normal rate, no murmurs, gallops, rubs    GI:  Soft, non distended, non tender. normoactive bowel sounds, no hepatosplenomegaly     Musculoskeletal:  No edema, warm, 2+ pulses throughout    Neurologic:  Moves all extremities. AAOx3, CN II-XII reviewed            Data Review:    Review and/or order of clinical lab test      Labs:     Recent Labs     12/21/21  0422 12/20/21  0112   WBC 15.4* 17.0*   HGB 15.9 15.2   HCT 46.3 45.5    321     Recent Labs     12/21/21  0422 12/20/21  0112 12/19/21  0341    138 139   K 2.8* 3.5 3.5   CL 98 107 104   CO2 31 24 28   BUN 18 25* 22*   CREA 1.05 1.16 1.05   GLU 91 142* 153*   CA 8.5 8.4* 8.8     No results for input(s): ALT, AP, TBIL, TBILI, TP, ALB, GLOB, GGT, AML, LPSE in the last 72 hours. No lab exists for component: SGOT, GPT, AMYP, HLPSE  No results for input(s): INR, PTP, APTT, INREXT, INREXT in the last 72 hours. No results for input(s): FE, TIBC, PSAT, FERR in the last 72 hours. No results found for: FOL, RBCF   No results for input(s): PH, PCO2, PO2 in the last 72 hours. No results for input(s): CPK, CKNDX, TROIQ in the last 72 hours.     No lab exists for component: CPKMB  No results found for: CHOL, CHOLX, CHLST, CHOLV, HDL, HDLP, LDL, LDLC, DLDLP, TGLX, TRIGL, Chavo Elias [facility], [facility]  Lab Results   Component Value Date/Time    Glucose (POC) 258 (H) 12/21/2021 11:16 AM    Glucose (POC) 106 12/21/2021 06:37 AM    Glucose (POC) 135 (H) 12/20/2021 09:11 PM    Glucose (POC) 156 (H) 12/20/2021 04:31 PM    Glucose (POC) 135 (H) 12/20/2021 01:20 PM     Lab Results   Component Value Date/Time    Color YELLOW 09/19/2010 06:04 AM    Appearance CLEAR 09/19/2010 06:04 AM    Specific gravity 1.023 09/19/2010 06:04 AM    pH (UA) 5.0 09/19/2010 06:04 AM    Protein NEGATIVE  09/19/2010 06:04 AM    Glucose NEGATIVE  09/19/2010 06:04 AM    Ketone NEGATIVE  09/19/2010 06:04 AM    Bilirubin NEGATIVE  09/19/2010 06:04 AM    Urobilinogen 0.2 09/19/2010 06:04 AM    Nitrites NEGATIVE  09/19/2010 06:04 AM    Leukocyte Esterase NEGATIVE  09/19/2010 06:04 AM         Medications Reviewed:     Current Facility-Administered Medications   Medication Dose Route Frequency    potassium chloride SR (KLOR-CON 10) tablet 40 mEq  40 mEq Oral TID WITH MEALS    potassium chloride 10 mEq in 100 ml IVPB  10 mEq IntraVENous Q1H    carvediloL (COREG) tablet 6.25 mg  6.25 mg Oral BID WITH MEALS    milrinone (PRIMACOR) 20 MG/100 ML D5W infusion  0.125 mcg/kg/min IntraVENous CONTINUOUS    bumetanide (BUMEX) 0.25 mg/mL infusion  1 mg/hr IntraVENous CONTINUOUS    losartan (COZAAR) tablet 12.5 mg  12.5 mg Oral QHS    sodium chloride (NS) flush 5-40 mL  5-40 mL IntraVENous Q8H    sodium chloride (NS) flush 5-40 mL  5-40 mL IntraVENous PRN    naloxone (NARCAN) injection 0.4 mg  0.4 mg IntraVENous PRN    insulin glargine (LANTUS) injection 50 Units  50 Units SubCUTAneous QHS    atorvastatin (LIPITOR) tablet 40 mg  40 mg Oral DAILY    alogliptin (NESINA) tablet 25 mg  25 mg Oral DAILY    glucose chewable tablet 16 g  4 Tablet Oral PRN    dextrose (D50W) injection syrg 12.5-25 g  25-50 mL IntraVENous PRN    glucagon (GLUCAGEN) injection 1 mg  1 mg IntraMUSCular PRN    insulin lispro (HUMALOG) injection SubCUTAneous AC&HS     ______________________________________________________________________  EXPECTED LENGTH OF STAY: 3d 16h  ACTUAL LENGTH OF STAY:          [facility] Jeannine MD

## 2021-12-21 NOTE — PROGRESS NOTES
0730: Bedside and Verbal shift change report given to Mino, [facility] (oncoming nurse) by Marcy Calle (offgoing nurse). Report included the following information SBAR, Kardex, Intake/Output, MAR, Accordion, Recent Results and Cardiac Rhythm nsr. 1930: Bedside and Verbal shift change report given to SLADE Love (oncoming nurse) by Belinda Chavez (offgoing nurse). Report included the following information SBAR, Kardex, Intake/Output, MAR, Accordion, Recent Results and Cardiac Rhythm nsr.

## 2021-12-22 LAB
ANION GAP SERPL CALC-SCNC: 6 MMOL/L (ref 5–15)
BASOPHILS # BLD: 0.1 K/UL (ref 0–0.1)
BASOPHILS NFR BLD: 1 % (ref 0–1)
BUN SERPL-MCNC: 18 MG/DL (ref 6–20)
BUN/CREAT SERPL: 15 (ref 12–20)
CALCIUM SERPL-MCNC: 9.1 MG/DL (ref 8.5–10.1)
CHLORIDE SERPL-SCNC: 98 MMOL/L (ref 97–108)
CO2 SERPL-SCNC: 30 MMOL/L (ref 21–32)
CREAT SERPL-MCNC: 1.2 MG/DL (ref 0.7–1.3)
DIFFERENTIAL METHOD BLD: ABNORMAL
EOSINOPHIL # BLD: 0.4 K/UL (ref 0–0.4)
EOSINOPHIL NFR BLD: 3 % (ref 0–7)
ERYTHROCYTE [DISTWIDTH] IN BLOOD BY AUTOMATED COUNT: 13.1 % (ref 11.5–14.5)
GLUCOSE BLD STRIP.AUTO-MCNC: 132 MG/DL (ref 65–117)
GLUCOSE BLD STRIP.AUTO-MCNC: 141 MG/DL (ref 65–117)
GLUCOSE BLD STRIP.AUTO-MCNC: 214 MG/DL (ref 65–117)
GLUCOSE BLD STRIP.AUTO-MCNC: 229 MG/DL (ref 65–117)
GLUCOSE SERPL-MCNC: 116 MG/DL (ref 65–100)
HCT VFR BLD AUTO: 49.7 % (ref 36.6–50.3)
HGB BLD-MCNC: 17.4 G/DL (ref 12.1–17)
IMM GRANULOCYTES # BLD AUTO: 0.1 K/UL (ref 0–0.04)
IMM GRANULOCYTES NFR BLD AUTO: 1 % (ref 0–0.5)
LYMPHOCYTES # BLD: 2.8 K/UL (ref 0.8–3.5)
LYMPHOCYTES NFR BLD: 20 % (ref 12–49)
MAGNESIUM SERPL-MCNC: 1.9 MG/DL (ref 1.6–2.4)
MCH RBC QN AUTO: 30.6 PG (ref 26–34)
MCHC RBC AUTO-ENTMCNC: 35 G/DL (ref 30–36.5)
MCV RBC AUTO: 87.3 FL (ref 80–99)
MONOCYTES # BLD: 1.4 K/UL (ref 0–1)
MONOCYTES NFR BLD: 10 % (ref 5–13)
NEUTS SEG # BLD: 8.9 K/UL (ref 1.8–8)
NEUTS SEG NFR BLD: 65 % (ref 32–75)
NRBC # BLD: 0 K/UL (ref 0–0.01)
NRBC BLD-RTO: 0 PER 100 WBC
PLATELET # BLD AUTO: 332 K/UL (ref 150–400)
PMV BLD AUTO: 10 FL (ref 8.9–12.9)
POTASSIUM SERPL-SCNC: 2.9 MMOL/L (ref 3.5–5.1)
RBC # BLD AUTO: 5.69 M/UL (ref 4.1–5.7)
SERVICE CMNT-IMP: ABNORMAL
SODIUM SERPL-SCNC: 134 MMOL/L (ref 136–145)
WBC # BLD AUTO: 13.7 K/UL (ref 4.1–11.1)

## 2021-12-22 PROCEDURE — 74011250637 HC RX REV CODE- 250/637: Performed by: INTERNAL MEDICINE

## 2021-12-22 PROCEDURE — 65660000000 HC RM CCU STEPDOWN

## 2021-12-22 PROCEDURE — 36415 COLL VENOUS BLD VENIPUNCTURE: CPT

## 2021-12-22 PROCEDURE — 74011636637 HC RX REV CODE- 636/637: Performed by: INTERNAL MEDICINE

## 2021-12-22 PROCEDURE — 82962 GLUCOSE BLOOD TEST: CPT

## 2021-12-22 PROCEDURE — 83735 ASSAY OF MAGNESIUM: CPT

## 2021-12-22 PROCEDURE — 74011636637 HC RX REV CODE- 636/637: Performed by: HOSPITALIST

## 2021-12-22 PROCEDURE — 99232 SBSQ HOSP IP/OBS MODERATE 35: CPT | Performed by: CLINICAL NURSE SPECIALIST

## 2021-12-22 PROCEDURE — 74011000250 HC RX REV CODE- 250: Performed by: INTERNAL MEDICINE

## 2021-12-22 PROCEDURE — 74011250636 HC RX REV CODE- 250/636: Performed by: INTERNAL MEDICINE

## 2021-12-22 PROCEDURE — 80048 BASIC METABOLIC PNL TOTAL CA: CPT

## 2021-12-22 PROCEDURE — 85025 COMPLETE CBC W/AUTO DIFF WBC: CPT

## 2021-12-22 PROCEDURE — 94760 N-INVAS EAR/PLS OXIMETRY 1: CPT

## 2021-12-22 RX ORDER — ENOXAPARIN SODIUM 100 MG/ML
40 INJECTION SUBCUTANEOUS EVERY 24 HOURS
Status: DISCONTINUED | OUTPATIENT
Start: 2021-12-22 | End: 2021-12-23 | Stop reason: HOSPADM

## 2021-12-22 RX ORDER — POTASSIUM CHLORIDE 14.9 MG/ML
10 INJECTION INTRAVENOUS
Status: COMPLETED | OUTPATIENT
Start: 2021-12-22 | End: 2021-12-22

## 2021-12-22 RX ORDER — INSULIN LISPRO 100 [IU]/ML
4 INJECTION, SOLUTION INTRAVENOUS; SUBCUTANEOUS
Status: DISCONTINUED | OUTPATIENT
Start: 2021-12-22 | End: 2021-12-23 | Stop reason: HOSPADM

## 2021-12-22 RX ORDER — POTASSIUM CHLORIDE 750 MG/1
40 TABLET, FILM COATED, EXTENDED RELEASE ORAL
Status: COMPLETED | OUTPATIENT
Start: 2021-12-22 | End: 2021-12-22

## 2021-12-22 RX ORDER — INSULIN GLARGINE 100 [IU]/ML
40 INJECTION, SOLUTION SUBCUTANEOUS
Status: DISCONTINUED | OUTPATIENT
Start: 2021-12-22 | End: 2021-12-23 | Stop reason: HOSPADM

## 2021-12-22 RX ORDER — BUMETANIDE 0.25 MG/ML
2 INJECTION INTRAMUSCULAR; INTRAVENOUS
Status: DISCONTINUED | OUTPATIENT
Start: 2021-12-22 | End: 2021-12-23

## 2021-12-22 RX ADMIN — POTASSIUM CHLORIDE 10 MEQ: 14.9 INJECTION, SOLUTION INTRAVENOUS at 08:31

## 2021-12-22 RX ADMIN — BUMETANIDE 2 MG: 0.25 INJECTION INTRAMUSCULAR; INTRAVENOUS at 12:47

## 2021-12-22 RX ADMIN — POTASSIUM CHLORIDE 10 MEQ: 14.9 INJECTION, SOLUTION INTRAVENOUS at 12:48

## 2021-12-22 RX ADMIN — ENOXAPARIN SODIUM 40 MG: 100 INJECTION SUBCUTANEOUS at 16:54

## 2021-12-22 RX ADMIN — Medication 10 ML: at 06:46

## 2021-12-22 RX ADMIN — POTASSIUM CHLORIDE 40 MEQ: 750 TABLET, FILM COATED, EXTENDED RELEASE ORAL at 08:31

## 2021-12-22 RX ADMIN — Medication 4 UNITS: at 16:54

## 2021-12-22 RX ADMIN — Medication 10 ML: at 22:43

## 2021-12-22 RX ADMIN — POTASSIUM CHLORIDE 10 MEQ: 14.9 INJECTION, SOLUTION INTRAVENOUS at 09:40

## 2021-12-22 RX ADMIN — Medication 10 ML: at 15:14

## 2021-12-22 RX ADMIN — BUMETANIDE 2 MG: 0.25 INJECTION INTRAMUSCULAR; INTRAVENOUS at 16:54

## 2021-12-22 RX ADMIN — POTASSIUM CHLORIDE 10 MEQ: 14.9 INJECTION, SOLUTION INTRAVENOUS at 11:11

## 2021-12-22 RX ADMIN — INSULIN GLARGINE 40 UNITS: 100 INJECTION, SOLUTION SUBCUTANEOUS at 22:41

## 2021-12-22 RX ADMIN — Medication 3 UNITS: at 16:53

## 2021-12-22 RX ADMIN — BUMETANIDE 1 MG/HR: 0.25 INJECTION INTRAMUSCULAR; INTRAVENOUS at 06:45

## 2021-12-22 RX ADMIN — CARVEDILOL 6.25 MG: 6.25 TABLET, FILM COATED ORAL at 16:54

## 2021-12-22 RX ADMIN — Medication 3 UNITS: at 12:48

## 2021-12-22 RX ADMIN — LOSARTAN POTASSIUM 12.5 MG: 25 TABLET, FILM COATED ORAL at 22:40

## 2021-12-22 RX ADMIN — ATORVASTATIN CALCIUM 40 MG: 40 TABLET, FILM COATED ORAL at 08:31

## 2021-12-22 RX ADMIN — ALOGLIPTIN 25 MG: 25 TABLET, FILM COATED ORAL at 08:31

## 2021-12-22 RX ADMIN — CARVEDILOL 6.25 MG: 6.25 TABLET, FILM COATED ORAL at 08:31

## 2021-12-22 NOTE — DIABETES MGMT
[address] [facility]    CLINICAL NURSE SPECIALIST CONSULT     Initial Presentation   Evette Lind is a 46 y.o. male admitted 12/16/21 with a 1 week c/o JAFFE with fatigue. Had abnormal EKG at urgent care and was told to come to ED. Labs in the ED were significant for , BNP 1933, WBC 14.9. HX: HTN    INITIAL DX:   Dyspnea [R06.00]  CHF (congestive heart failure) ([address] [I50.9]     Current Treatment     TX: ECHO/ CTA chest, Specialty consultations: cardiology and diabetes health    Consulted by Provider for advanced diabetes nursing assessment and care for:   [x] Inpatient management strategy  [x] Home management assessment  [x] Survival skill education    Hospital Course   Clinical progress has been complicated by new diagnosis of diabetes and HF  12/16: Admission  12/18: Seen by cardiology. ECHO with LV hypokinesis, EF 15-20%  12/20: Cardiac cath, no significant CAD but with elevated R side pressures  12/21: Starting milrinone, switching to intermittent bumex  Diabetes History   New on set diabetes-     Diabetes-related Medical History  Acute complications  hyperglycemia  Neurological complications  none  Microvascular disease  none  Macrovascular disease  none  Other associated conditions     HF-new onset    Diabetes Medication History  Key Antihyperglycemic Medications     Patient is on no antihyperglycemic meds. Diabetes self-management practices:   Eating pattern- followed keto diet 6-7 yrs and lost over 140lbs and then it got to be too much and has put some of that lost weight back on.        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 Conversant and cooperative  Neuro  Alert, oriented   Vital Signs   Visit Vitals  /77   Pulse 89   Temp 97.7 °F (36.5 °C)   Resp 18   Ht 5' 9\" (1.753 m)   Wt 125 kg (275 lb 9.2 oz)   SpO2 96%   BMI 40.70 kg/m²     Skin  Warm and dry. Heart   Regular rate and rhythm. No murmurs, rubs or gallops  Lungs  Clear to auscultation without rales or rhonchi  Extremities No foot wounds        Laboratory  Recent Labs     21  0418 21  1822 21  0422 21  0112 21  0112   * 128* 91   < > 142*   AGAP 6 8 9   < > 7   WBC 13.7*  --  15.4*  --  17.0*   CREA 1.20 1.30 1.05   < > 1.16   GFRNA >60 58* >60   < > >60   AST  --  41*  --   --   --    ALT  --  130*  --   --   --     < > = values in this interval not displayed.        Factors impacting BG management  Factor Dose Comments   Nutrition:  Standard meals     60 grams/meal      Other:   Kidney function  Liver function     GFR >60      Blood glucose pattern      Significant diabetes-related events over the past 24-72 hours  Admitting /A1C 10%    Basal: 50 units Lantus once daily- decreasing to 40 units tonight  Correctional: 3 units in the last 24h  Bolus: None  Alogliptin: 25mg daily  Fasting B  Pre-prandial B-229    Assessment and Plan   Nursing Diagnosis Risk for unstable blood glucose pattern   Nursing Intervention Domain 1949 Decision-making Support   Nursing Interventions Examined current inpatient diabetes/blood glucose control   Explored factors facilitating and impeding inpatient management  Explored corrective strategies with patient and responsible inpatient provider   Informed patient of rational for insulin strategy while hospitalized     Nursing Diagnosis 53225 Ineffective Health Management   Nursing Intervention Domain 12 Decision-makingSupport   Nursing Interventions Identified diabetes self-management practices impeding diabetes control  Discussed diabetes survival skills related to  1. Healthy Plate eating plan; given handouts  2. Role of physical activity in improving insulin sensitivity and action  3. Procedure for blood glucose monitoring & options for ow-cost products available from [facility]   4. Medications plan at discharge       Evaluation   Pippa Islas is a 46year old pleasant gentleman, with no previous history of diabetes, presented to the ED with a 1 week c/o shortness of breath and fatigue and found to have new onset combined systolic and diastolic congestive heart failure with EF 15-20%. IV diuresis and inotropes have been initiated for which he has had excellent response to. His glucose was also noted to be elevated on admission at 297 and A1C resulted at 10% indicating new diagnosis of Type 2 Diabetes. On interview, patient reports increased thirst and urination over the last several weeks but denies blurry vision, numbness/tingling in hands/feet. Basal/correctional insulin and 25mg alogliptin daily have been started during admission for which he had good response to. Lantus was started at low dose 30 units once daily and titrated to high dose 50units once daily. As he has been diuresed, his appetite has improved and now with pre-prandial hyperglycemia. He would benefit from a basal dose reduction and adding pre-meal humalog as oral intake improved. Please adjust to an inpatient glucose goal of 100-180mg/dl. Given his dx of heart failure, he would benefit from using a glucodiuretic, jardiance, as opposed to alogliptin on discharge. Recommendations   1. POC glucose ACHS    2. Consistent carbohydrate diet (60 grams CHO/meal)     3. D/C Alogliptin- little overall A1C benefit and will need to be on an SGLT2I with his HF dx on discharge    4.  Adjust the Subcutaneous Insulin Order set (6749): Insulin Dosing Specific recommendation   Basal                                     (Based on weight, BMI & GFR)   Reduce Lantus to 40 units daily  (Moderate Dose 0.3units/kg/day)      ADD pre meal Nutritional                   (Based on CHO/dextrose load) [x] Normal sensitivity  4 units TID Humalog   Advance by 2 units daily for persistent pre-prandial hyperglycemia   Corrective                          (Useful in adjusting insulin dosing) [x] Normal sensitivity: ACHS        Please encourage patient to participate in diabetes self care while in the hospital including allowing patient to use lancet for blood glucose monitoring and self-administer insulin injections. Discharge Planning   1. Will need a FUV with PCP within 1-2 weeks after hospital discharge for ongoing diabetes management. Would recommend establishing care with endocrinology and will send a note to our clinic to schedule. 2. Start Metformin XR 500mg BID    3. Start Lantus PEN: Dose TBD    4. Pen Needle, Diabetic 32 Gauge x 1/4\" (1 box)    5. Start Jardiance 10mg once daily    6. Prescription for glucometer kit (Meter, Lancets (100), Strips (100)). Patient to obtain a blood glucose reading four times daily. First thing in the morning prior to eating and drinking anything then before lunch, dinner and bedtime. Create a log and present to PCP for interpretation. 7. On Discharge, please place an outpatient order for \"diabetes education\" (enter as REF20). This will trigger a referral for the Program for Diabetes Health which includes outpatient diabetes self management training with a certified diabetes educator. Billing Code(s)   32171    Before making these care recommendations, I personally reviewed the hospitalization record, including notes, laboratory & diagnostic data and current medications, and examined the patient at the bedside (circumstances permitting) before making care recommendations.      Total minutes: 25    MANSOOR Garcia  Diabetes Clinical Nurse Specialist  Program for Diabetes Health  Access via [facility]

## 2021-12-22 NOTE — PROGRESS NOTES
0815: Bedside and Verbal shift change report given to [address]  (oncoming nurse) by Eduarda Cordero (offgoing nurse). Report included the following information SBAR, Kardex, Intake/Output, MAR, Recent Results, Med Rec Status and Cardiac Rhythm NSR.

## 2021-12-22 NOTE — PROGRESS NOTES
[facility] Cardiology Progress Note    Date of Service: 12/22/2021    Subjective:  No acute events overnight. Continued excellent diuresis. Feels markedly improved compared with on admission. Denies LH, CP. Ankle edema is nearly resolved.     Objective:    Visit Vitals  /77   Pulse 94   Temp 97.7 °F (36.5 °C)   Resp 18   Ht 5' 9\" (1.753 m)   Wt 125 kg (275 lb 9.2 oz)   SpO2 96%   BMI 40.70 kg/m²         Intake/Output Summary (Last 24 hours) at 12/22/2021 1154  Last data filed at 12/22/2021 0844  Gross per 24 hour   Intake 1357.49 ml   Output 4050 ml   Net -2692.51 ml        Physical Exam  GEN: NAD, appears older than stated age  HEENT: EOMI, MMM, OP clear, poor dentition with several rotten teeth  NECK: Normal JVP  CV: RRR, normal S1 and S2, no M/R/G  LUNGS: CTAB   ABD: NABS, soft, NT/ND  EXT: None to trace pitting edema in b/l ankles   PSYCH: Mood and affect normal  NEURO: AAO, MAEW, face symmetrical, speech intact    Current Facility-Administered Medications   Medication Dose Route Frequency    potassium chloride 10 mEq in 50 ml IVPB  10 mEq IntraVENous Q1H    insulin glargine (LANTUS) injection 40 Units  40 Units SubCUTAneous QHS    bumetanide (BUMEX) injection 2 mg  2 mg IntraVENous TIDAC    carvediloL (COREG) tablet 6.25 mg  6.25 mg Oral BID WITH MEALS    losartan (COZAAR) tablet 12.5 mg  12.5 mg Oral QHS    sodium chloride (NS) flush 5-40 mL  5-40 mL IntraVENous Q8H    sodium chloride (NS) flush 5-40 mL  5-40 mL IntraVENous PRN    naloxone (NARCAN) injection 0.4 mg  0.4 mg IntraVENous PRN    atorvastatin (LIPITOR) tablet 40 mg  40 mg Oral DAILY    alogliptin (NESINA) tablet 25 mg  25 mg Oral DAILY    glucose chewable tablet 16 g  4 Tablet Oral PRN    dextrose (D50W) injection syrg 12.5-25 g  25-50 mL IntraVENous PRN    glucagon (GLUCAGEN) injection 1 mg  1 mg IntraMUSCular PRN    insulin lispro (HUMALOG) injection   SubCUTAneous AC&HS       Data Reviewed:  Recent Labs     12/22/21  1514 12/21/21  1822 12/21/21 0422   * 134* 138   K 2.9* 3.4* 2.8*   CL 98 98 98   CO2 30 28 31   * 128* 91   BUN 18 20 18   CREA 1.20 1.30 1.05   CA 9.1 9.1 8.5   MG 1.9  --   --    ALB  --  3.6  --    ALT  --  130*  --      Recent Labs     12/22/21  0418 12/21/21  0422 12/20/21  0112   WBC 13.7* 15.4* 17.0*   HGB 17.4* 15.9 15.2   HCT 49.7 46.3 45.5    321 321     No results found for: SDES  Lab Results   Component Value Date/Time    Culture result: NO GROWTH 5 DAYS 12/16/2021 12:34 PM       All Cardiac Markers in the last 24 hours:  No results found for: CPK, CK, CKMMB, CKMB, RCK3, CKMBT, CKMBPOC, CKNDX, CKND1, MISAEL, TROPT, TROIQ, SIDRA, TROPT, TNIPOC, BNP, BNPP, BNPNT    Telemetry (personally reviewed): normal sinus rhythm    Assessment:  1. New diagnosis of acute combined systolic and diastolic CHF with EF 09-31%, NYHA class IV on admission   - Essentially normal epicardial coronary arteries with changes in the distal/apical vessels c/w cardiomyopathy   - NICM from longstanding HTN, DM2, obesity, metabolic syndrome  2. Dyspnea, likely due to #1; resolved  3. HTN  4. DM2 with other circulatory complications  5. Morbid obesity  6. Moderate/severe RV hypokinesis  7. Restrictive diastolic dysfunction  8.  Mild mitral regurgitation    Plan:  - Discontinue milrinone  - Change bumetanide drip to intermittent IV dosing with bumetanide 2 mg IV TIDAC  - Plan to change to oral bumetanide tomorrow (Thursday) with potential discharge tomorrow (Thursday) afternoon versus Friday morning  - Strict Is and Os, daily weights  - Continue carvedilol 6.25 mg BID  - Continue losartan 12.5 mg qhs  - May change to [facility] if has BP room  - Will eventually try to start MRA/SGLT2i  - Borderline hypotension prohibits increasing HF therapies at this time  - Awaiting LifeVest fitting  - Supplement electrolytes  - Discussed with patient and patient's family  - Discussed with care manager    Thank you for the opportunity to participate in the care of Gm Bermeo and please do not hesitate to contact us should you have any questions. Signed:  Jackson Weaver.  Pascual Arnold, [facility] [facility] / [address] [place] [address] [facility]ovascular [facility]ists  12/22/21

## 2021-12-22 NOTE — PROGRESS NOTES
[facility]  Hospitalist Group                                                                                          Hospitalist Progress Note  Lizz Ellison MD  Answering service: [phone] from in house phone        Date of Service:  2021  NAME:  Alexandre Mccoy  :  1970  MRN:  [id number]      Admission Summary:     49y/o male with no known prior history presented with SOB. Found to have pulmonary edema. Interval history / Subjective:   Feels much better overall, lower extremities are not as swollen, able to lay flat last night. Breathing much improved. Assessment & Plan:     Acute new onset systolic congestive heart failure POA - EF 15-20% NICM  - s/p LHC/RHC without significant CAD   - Continue coreg, losartan  - May begin [facility] inpatient vs. O/p per cards  - failed intermittent lasix early in hospitalization   -Currently on bumex and milrinone gtt --> plan to transition to intermittent IV bumex today per cardiology, and to discontinue milrinone.  To PO potentially on   -Patient will need a LifeVest on discharge, order placed and CM aware.   -He also has elevated leukocytosis with unclear etiology, will check ESR and CRP to evaluate for myocarditis    New onset type 2 diabetes with hyperglycemia - A1c 10%  - Continue 40U lantus daily   - DC alogliptin, we should not be using oral agents in the hospital   - Will benefit from [place] on DC given his CHF  - May also introduce Metformin on discharge  -Schedule mealtime insulin 4U   -Diabetes education appreciated  - Pt will need insulin, supplies, etc. On DC    Hypertension:  -Blood pressure better controlled with Coreg and losartan  - monitor     History of brain aneurysm:  -Status post clipping in        Code status: Full code  DVT prophylaxis: lovenox    Care Plan discussed with: Patient/Family  Anticipated Disposition: Home w/Family  Anticipated Discharge: 24 hours to 48 hours Hospital Problems  Never Reviewed          Codes Class Noted POA    Dyspnea ICD-10-CM: R06.00  ICD-9-CM: 786.09  12/16/2021 Unknown        CHF (congestive heart failure) (HCC) ICD-10-CM: I50.9  ICD-9-CM: 428.0  12/16/2021 Unknown                Review of Systems:   A comprehensive review of systems was negative except for that written in the HPI. Vital Signs:    Last 24hrs VS reviewed since prior progress note. Most recent are:  Visit Vitals  /84   Pulse 91   Temp 97.9 °F (36.6 °C)   Resp 20   Ht 5' 9\" (1.753 m)   Wt 125 kg (275 lb 9.2 oz)   SpO2 95%   BMI 40.70 kg/m²         Intake/Output Summary (Last 24 hours) at 12/22/2021 1523  Last data filed at 12/22/2021 1247  Gross per 24 hour   Intake 1597.49 ml   Output 4400 ml   Net -2802.51 ml        Physical Examination:     I had a face to face encounter with this patient and independently examined them on 12/22/2021 as outlined below:          Constitutional:  No acute distress, cooperative, pleasant    ENT:  Oral mucosa moist, oropharynx benign. Resp:  CTA bilaterally. No wheezing/rhonchi/rales. No accessory muscle use   CV:  Regular rhythm, normal rate, no murmurs, gallops, rubs    GI:  Soft, non distended, non tender. normoactive bowel sounds, no hepatosplenomegaly     Musculoskeletal:  No edema, warm, 2+ pulses throughout     Neurologic:  Moves all extremities.   AAOx3, CN II-XII reviewed             Data Review:    Review and/or order of clinical lab test      Labs:     Recent Labs     12/22/21 0418 12/21/21 0422   WBC 13.7* 15.4*   HGB 17.4* 15.9   HCT 49.7 46.3    321     Recent Labs     12/22/21 0418 12/21/21 1822 12/21/21 0422   * 134* 138   K 2.9* 3.4* 2.8*   CL 98 98 98   CO2 30 28 31   BUN 18 20 18   CREA 1.20 1.30 1.05   * 128* 91   CA 9.1 9.1 8.5   MG 1.9  --   --      Recent Labs     12/21/21  1822   *   AP 79   TBILI 1.5*   TP 6.9   ALB 3.6   GLOB 3.3     No results for input(s): INR, PTP, APTT, INREXT, INREXT in the last 72 hours. No results for input(s): FE, TIBC, PSAT, FERR in the last 72 hours. No results found for: FOL, RBCF   No results for input(s): PH, PCO2, PO2 in the last 72 hours. No results for input(s): CPK, CKNDX, TROIQ in the last 72 hours.     No lab exists for component: CPKMB  No results found for: CHOL, CHOLX, CHLST, CHOLV, HDL, HDLP, LDL, LDLC, DLDLP, TGLX, TRIGL, TRIGP, CHHD, CHHDX  Lab Results   Component Value Date/Time    Glucose (POC) 229 (H) 12/22/2021 12:03 PM    Glucose (POC) 132 (H) 12/22/2021 06:49 AM    Glucose (POC) 172 (H) 12/21/2021 09:18 PM    Glucose (POC) 121 (H) 12/21/2021 04:14 PM    Glucose (POC) 258 (H) 12/21/2021 11:16 AM     Lab Results   Component Value Date/Time    Color YELLOW 09/19/2010 06:04 AM    Appearance CLEAR 09/19/2010 06:04 AM    Specific gravity 1.023 09/19/2010 06:04 AM    pH (UA) 5.0 09/19/2010 06:04 AM    Protein NEGATIVE  09/19/2010 06:04 AM    Glucose NEGATIVE  09/19/2010 06:04 AM    Ketone NEGATIVE  09/19/2010 06:04 AM    Bilirubin NEGATIVE  09/19/2010 06:04 AM    Urobilinogen 0.2 09/19/2010 06:04 AM    Nitrites NEGATIVE  09/19/2010 06:04 AM    Leukocyte Esterase NEGATIVE  09/19/2010 06:04 AM         Medications Reviewed:     Current Facility-Administered Medications   Medication Dose Route Frequency    insulin glargine (LANTUS) injection 40 Units  40 Units SubCUTAneous QHS    bumetanide (BUMEX) injection 2 mg  2 mg IntraVENous TIDAC    insulin lispro (HUMALOG) injection 4 Units  4 Units SubCUTAneous TID WITH MEALS    carvediloL (COREG) tablet 6.25 mg  6.25 mg Oral BID WITH MEALS    losartan (COZAAR) tablet 12.5 mg  12.5 mg Oral QHS    sodium chloride (NS) flush 5-40 mL  5-40 mL IntraVENous Q8H    sodium chloride (NS) flush 5-40 mL  5-40 mL IntraVENous PRN    naloxone (NARCAN) injection 0.4 mg  0.4 mg IntraVENous PRN    atorvastatin (LIPITOR) tablet 40 mg  40 mg Oral DAILY    glucose chewable tablet 16 g  4 Tablet Oral PRN    dextrose (D50W) injection syrg 12.5-25 g  25-50 mL IntraVENous PRN    glucagon (GLUCAGEN) injection 1 mg  1 mg IntraMUSCular PRN    insulin lispro (HUMALOG) injection   SubCUTAneous AC&HS     ______________________________________________________________________  EXPECTED LENGTH OF STAY: 3d 16h  ACTUAL LENGTH OF STAY:          6                 Lauren Yen MD

## 2021-12-23 VITALS
DIASTOLIC BLOOD PRESSURE: 70 MMHG | HEART RATE: 93 BPM | BODY MASS INDEX: 41.08 KG/M2 | WEIGHT: 277.34 LBS | OXYGEN SATURATION: 98 % | SYSTOLIC BLOOD PRESSURE: 112 MMHG | TEMPERATURE: 98.1 F | HEIGHT: 69 IN | RESPIRATION RATE: 18 BRPM

## 2021-12-23 DIAGNOSIS — E11.65 TYPE 2 DIABETES MELLITUS WITH HYPERGLYCEMIA, WITHOUT LONG-TERM CURRENT USE OF INSULIN (HCC): Primary | ICD-10-CM

## 2021-12-23 LAB
ANION GAP SERPL CALC-SCNC: 7 MMOL/L (ref 5–15)
BASOPHILS # BLD: 0.1 K/UL (ref 0–0.1)
BASOPHILS NFR BLD: 1 % (ref 0–1)
BUN SERPL-MCNC: 23 MG/DL (ref 6–20)
BUN/CREAT SERPL: 19 (ref 12–20)
CALCIUM SERPL-MCNC: 8.9 MG/DL (ref 8.5–10.1)
CHLORIDE SERPL-SCNC: 98 MMOL/L (ref 97–108)
CO2 SERPL-SCNC: 31 MMOL/L (ref 21–32)
CREAT SERPL-MCNC: 1.22 MG/DL (ref 0.7–1.3)
DIFFERENTIAL METHOD BLD: ABNORMAL
EOSINOPHIL # BLD: 0.4 K/UL (ref 0–0.4)
EOSINOPHIL NFR BLD: 3 % (ref 0–7)
ERYTHROCYTE [DISTWIDTH] IN BLOOD BY AUTOMATED COUNT: 13.1 % (ref 11.5–14.5)
GLUCOSE BLD STRIP.AUTO-MCNC: 130 MG/DL (ref 65–117)
GLUCOSE SERPL-MCNC: 125 MG/DL (ref 65–100)
HCT VFR BLD AUTO: 49.1 % (ref 36.6–50.3)
HGB BLD-MCNC: 16.9 G/DL (ref 12.1–17)
IMM GRANULOCYTES # BLD AUTO: 0.2 K/UL (ref 0–0.04)
IMM GRANULOCYTES NFR BLD AUTO: 1 % (ref 0–0.5)
LYMPHOCYTES # BLD: 3 K/UL (ref 0.8–3.5)
LYMPHOCYTES NFR BLD: 19 % (ref 12–49)
MAGNESIUM SERPL-MCNC: 2 MG/DL (ref 1.6–2.4)
MCH RBC QN AUTO: 30.3 PG (ref 26–34)
MCHC RBC AUTO-ENTMCNC: 34.4 G/DL (ref 30–36.5)
MCV RBC AUTO: 88.2 FL (ref 80–99)
MONOCYTES # BLD: 1.8 K/UL (ref 0–1)
MONOCYTES NFR BLD: 12 % (ref 5–13)
NEUTS SEG # BLD: 10.2 K/UL (ref 1.8–8)
NEUTS SEG NFR BLD: 64 % (ref 32–75)
NRBC # BLD: 0 K/UL (ref 0–0.01)
NRBC BLD-RTO: 0 PER 100 WBC
PHOSPHATE SERPL-MCNC: 4.7 MG/DL (ref 2.6–4.7)
PLATELET # BLD AUTO: 304 K/UL (ref 150–400)
PMV BLD AUTO: 10 FL (ref 8.9–12.9)
POTASSIUM SERPL-SCNC: 3.1 MMOL/L (ref 3.5–5.1)
RBC # BLD AUTO: 5.57 M/UL (ref 4.1–5.7)
SERVICE CMNT-IMP: ABNORMAL
SODIUM SERPL-SCNC: 136 MMOL/L (ref 136–145)
WBC # BLD AUTO: 15.7 K/UL (ref 4.1–11.1)

## 2021-12-23 PROCEDURE — 74011250637 HC RX REV CODE- 250/637: Performed by: INTERNAL MEDICINE

## 2021-12-23 PROCEDURE — 74011636637 HC RX REV CODE- 636/637: Performed by: INTERNAL MEDICINE

## 2021-12-23 PROCEDURE — 80048 BASIC METABOLIC PNL TOTAL CA: CPT

## 2021-12-23 PROCEDURE — 82962 GLUCOSE BLOOD TEST: CPT

## 2021-12-23 PROCEDURE — 84100 ASSAY OF PHOSPHORUS: CPT

## 2021-12-23 PROCEDURE — 74011250636 HC RX REV CODE- 250/636: Performed by: STUDENT IN AN ORGANIZED HEALTH CARE EDUCATION/TRAINING PROGRAM

## 2021-12-23 PROCEDURE — 36415 COLL VENOUS BLD VENIPUNCTURE: CPT

## 2021-12-23 PROCEDURE — 85025 COMPLETE CBC W/AUTO DIFF WBC: CPT

## 2021-12-23 PROCEDURE — 90686 IIV4 VACC NO PRSV 0.5 ML IM: CPT | Performed by: STUDENT IN AN ORGANIZED HEALTH CARE EDUCATION/TRAINING PROGRAM

## 2021-12-23 PROCEDURE — 99232 SBSQ HOSP IP/OBS MODERATE 35: CPT | Performed by: CLINICAL NURSE SPECIALIST

## 2021-12-23 PROCEDURE — 83735 ASSAY OF MAGNESIUM: CPT

## 2021-12-23 PROCEDURE — 90471 IMMUNIZATION ADMIN: CPT

## 2021-12-23 RX ORDER — INSULIN GLARGINE 100 [IU]/ML
36 INJECTION, SOLUTION SUBCUTANEOUS
Qty: 10.8 ML | Refills: 0 | Status: SHIPPED | OUTPATIENT
Start: 2021-12-23 | End: 2022-01-22

## 2021-12-23 RX ORDER — INSULIN LISPRO 100 [IU]/ML
4 INJECTION, SOLUTION SUBCUTANEOUS
Qty: 40 ML | Refills: 1 | Status: SHIPPED | OUTPATIENT
Start: 2021-12-23 | End: 2022-01-22

## 2021-12-23 RX ORDER — POTASSIUM CHLORIDE 750 MG/1
40 TABLET, FILM COATED, EXTENDED RELEASE ORAL DAILY
Status: DISCONTINUED | OUTPATIENT
Start: 2021-12-23 | End: 2021-12-23 | Stop reason: HOSPADM

## 2021-12-23 RX ORDER — INSULIN GLARGINE 100 [IU]/ML
40 INJECTION, SOLUTION SUBCUTANEOUS
Qty: 12 ML | Refills: 0 | Status: SHIPPED | OUTPATIENT
Start: 2021-12-23 | End: 2021-12-23

## 2021-12-23 RX ORDER — BUMETANIDE 1 MG/1
2 TABLET ORAL DAILY
Status: DISCONTINUED | OUTPATIENT
Start: 2021-12-23 | End: 2021-12-23 | Stop reason: HOSPADM

## 2021-12-23 RX ORDER — ATORVASTATIN CALCIUM 40 MG/1
40 TABLET, FILM COATED ORAL DAILY
Qty: 30 TABLET | Refills: 0 | Status: SHIPPED | OUTPATIENT
Start: 2021-12-24 | End: 2022-01-23

## 2021-12-23 RX ORDER — METOPROLOL SUCCINATE 25 MG/1
25 TABLET, EXTENDED RELEASE ORAL DAILY
Status: DISCONTINUED | OUTPATIENT
Start: 2021-12-24 | End: 2021-12-23 | Stop reason: HOSPADM

## 2021-12-23 RX ORDER — LOSARTAN POTASSIUM 25 MG/1
12.5 TABLET ORAL
Qty: 15 TABLET | Refills: 0 | Status: SHIPPED | OUTPATIENT
Start: 2021-12-24 | End: 2022-01-23

## 2021-12-23 RX ORDER — METOPROLOL SUCCINATE 25 MG/1
25 TABLET, EXTENDED RELEASE ORAL DAILY
Qty: 30 TABLET | Refills: 0 | Status: SHIPPED | OUTPATIENT
Start: 2021-12-24 | End: 2022-01-23

## 2021-12-23 RX ORDER — POTASSIUM CHLORIDE 1500 MG/1
40 TABLET, FILM COATED, EXTENDED RELEASE ORAL DAILY
Qty: 60 TABLET | Refills: 0 | Status: SHIPPED | OUTPATIENT
Start: 2021-12-24 | End: 2022-01-23

## 2021-12-23 RX ORDER — BUMETANIDE 2 MG/1
2 TABLET ORAL DAILY
Qty: 30 TABLET | Refills: 0 | Status: SHIPPED | OUTPATIENT
Start: 2021-12-24 | End: 2022-01-23

## 2021-12-23 RX ADMIN — POTASSIUM CHLORIDE 40 MEQ: 750 TABLET, FILM COATED, EXTENDED RELEASE ORAL at 11:15

## 2021-12-23 RX ADMIN — INFLUENZA VIRUS VACCINE 0.5 ML: 15; 15; 15; 15 SUSPENSION INTRAMUSCULAR at 12:24

## 2021-12-23 RX ADMIN — Medication 10 ML: at 05:04

## 2021-12-23 RX ADMIN — ATORVASTATIN CALCIUM 40 MG: 40 TABLET, FILM COATED ORAL at 09:05

## 2021-12-23 RX ADMIN — Medication 4 UNITS: at 09:05

## 2021-12-23 RX ADMIN — BUMETANIDE 2 MG: 1 TABLET ORAL at 11:16

## 2021-12-23 NOTE — DIABETES MGMT
[facility]    CLINICAL NURSE SPECIALIST CONSULT     Initial Presentation   Shiloh Townsend is a 46 y.o. male admitted 12/16/21 with a 1 week c/o JAFFE with fatigue. Had abnormal EKG at urgent care and was told to come to ED. Labs in the ED were significant for , BNP 1933, WBC 14.9. HX: HTN    INITIAL DX:   Dyspnea [R06.00]  CHF (congestive heart failure) ([address] [I50.9]     Current Treatment     TX: ECHO/ CTA chest, Specialty consultations: cardiology and diabetes health    Consulted by Provider for advanced diabetes nursing assessment and care for:   [x] Inpatient management strategy  [x] Home management assessment  [x] Survival skill education    Hospital Course   Clinical progress has been complicated by new diagnosis of diabetes and HF  12/16: Admission  12/18: Seen by cardiology. ECHO with LV hypokinesis, EF 15-20%  12/20: Cardiac cath, no significant CAD but with elevated R side pressures  12/21: Starting milrinone, switching to intermittent bumex  Diabetes History   New on set diabetes-     Diabetes-related Medical History  Acute complications  hyperglycemia  Neurological complications  none  Microvascular disease  none  Macrovascular disease  none  Other associated conditions     HF-new onset    Diabetes Medication History  Key Antihyperglycemic Medications     Patient is on no antihyperglycemic meds. Diabetes self-management practices:   Eating pattern- followed keto diet 6-7 yrs and lost over 140lbs and then it got to be too much and has put some of that lost weight back on.        Physical activity pattern- works full time as [employer] tech-no activity outside of work     Monitoring pattern-NA     Taking medications pattern-NA    Social determinants of health impacting diabetes self-management practices   Concerned that you need to know more about how to stay healthy with diabetes  Overall evaluation:    [x] NOT Achieving A1c target with drug therapy & self-care practices    Subjective   I matty knew I may become a diabetic\"    +strong family history  Works full time -AV technician  Has 15yo daughter   Objective   Physical exam  General Obese male in no acute distress. Conversant and cooperative  Neuro  Alert, oriented   Vital Signs   Visit Vitals  BP (!) 86/39   Pulse 86   Temp 98.1 °F (36.7 °C)   Resp 16   Ht 5' 9\" (1.753 m)   Wt 125.8 kg (277 lb 5.4 oz)   SpO2 98%   BMI 40.96 kg/m²     Skin  Warm and dry. Heart   Regular rate and rhythm. No murmurs, rubs or gallops  Lungs  Clear to auscultation without rales or rhonchi  Extremities No foot wounds        Laboratory  Recent Labs     21  0434 21  04321  0418 21  1822 21  0422 21   GLU  --  125* 116* 128*   < > 91   AGAP  --  7 6 8   < > 9   WBC 15.7*  --  13.7*  --   --  15.4*   CREA  --  1.22 1.20 1.30   < > 1.05   GFRNA  --  >60 >60 58*   < > >60   AST  --   --   --  41*  --   --    ALT  --   --   --  130*  --   --     < > = values in this interval not displayed.        Factors impacting BG management  Factor Dose Comments   Nutrition:  Standard meals     60 grams/meal      Other:   Kidney function  Liver function     GFR >60      Blood glucose pattern      Significant diabetes-related events over the past 24-72 hours  Admitting /A1C 10%    Basal: 40 units Lantus once daily  Bolus: 4 units humalog/meal  Correctional: 6 units in the last 24h  Alogliptin: 25mg daily- d/c  Fasting B  Pre-prandial B-214    Assessment and Plan   Nursing Diagnosis Risk for unstable blood glucose pattern   Nursing Intervention Domain 8427 Decision-making Support   Nursing Interventions Examined current inpatient diabetes/blood glucose control   Explored factors facilitating and impeding inpatient management  Explored corrective strategies with patient and responsible inpatient provider   Informed patient of rational for insulin strategy while hospitalized     Nursing Diagnosis 87664 Ineffective Health Management   Nursing Intervention Domain 12 Decision-makingSupport   Nursing Interventions Identified diabetes self-management practices impeding diabetes control  Discussed diabetes survival skills related to  1. Healthy Plate eating plan; given handouts  2. Role of physical activity in improving insulin sensitivity and action  3. Procedure for blood glucose monitoring & options for ow-cost products available from [facility]   4. Medications plan at discharge       Evaluation   Gauri Crain is a 46year old pleasant gentleman, with no previous history of diabetes, presented to the ED with a 1 week c/o shortness of breath and fatigue and found to have new onset combined systolic and diastolic congestive heart failure with EF 15-20%. IV diuresis and inotropes have been initiated for which he has had excellent response to. His glucose was also noted to be elevated on admission at 297 and A1C resulted at 10% indicating new diagnosis of Type 2 Diabetes. On interview, patient reports increased thirst and urination over the last several weeks but denies blurry vision, numbness/tingling in hands/feet. Basal/correctional insulin and 25mg alogliptin daily have been started during admission for which he had good response to. Lantus was started at low dose 30 units once daily and titrated to high dose 50 units once daily. As he has been diuresed, his appetite has improved and now with pre-prandial hyperglycemia. He benefitted from a basal dose reduction and addition of pre-meal humalog as oral intake improved. Please adjust to an inpatient glucose goal of 100-180mg/dl. Given his dx of heart failure, he would benefit from using a glucodiuretic, jardiance, on discharge. Recommendations   1. POC glucose ACHS    2. Consistent carbohydrate diet (60 grams CHO/meal)     3. Continue the Subcutaneous Insulin Order set (5042):   Insulin Dosing Specific recommendation   Basal (Based on weight, BMI & GFR) Lantus to 40 units daily  (Moderate Dose 0.3units/kg/day)   If fasting BG under 200,   reduce to 36 units daily   Nutritional                     (Based on CHO/dextrose load) [x] Normal sensitivity  4 units TID Humalog   Advance by 2 units daily for persistent pre-prandial hyperglycemia   Corrective                          (Useful in adjusting insulin dosing) [x] Normal sensitivity: ACHS        Please encourage patient to participate in diabetes self care while in the hospital including allowing patient to use lancet for blood glucose monitoring and self-administer insulin injections. Will see again on Monday if he is here  Discharge Planning   1. Will need a FUV with PCP within 1-2 weeks after hospital discharge for ongoing diabetes management. Would recommend establishing care with endocrinology and will send a note to our clinic to schedule. 2. Start Metformin XR 500mg BID    3. Start Lantus PEN: 30 units daily (or hospital based dose if lower)    4. Pen Needle, Diabetic 32 Gauge x 1/4\" (1 box)    5. Start Farxiga 10mg once daily (on formulary tier 1 per insurance)    6. Prescription for glucometer kit (Meter, Lancets (100), Strips (100)). Patient to obtain a blood glucose reading four times daily. First thing in the morning prior to eating and drinking anything then before lunch, dinner and bedtime. Create a log and present to PCP for interpretation. 7. On Discharge, outpatient order for \"diabetes education\" (enter as REF20) has been placed. This will trigger a referral for the Program [facility] which includes outpatient diabetes self management training with a certified diabetes educator.     Billing Code(s)   86509    Before making these care recommendations, I personally reviewed the hospitalization record, including notes, laboratory & diagnostic data and current medications, and examined the patient at the bedside (circumstances permitting) before making care recommendations.      Total minutes: 25    MANSOOR Navarro  Diabetes Clinical Nurse Specialist  [facility]  Access via Continuing Education Records & Resources

## 2021-12-23 NOTE — PROGRESS NOTES
0730: Bedside and Verbal shift change report given to [address] [place] [address] (oncoming nurse) by Robyne Goldberg (offgoing nurse). Report included the following information SBAR, Kardex, Intake/Output, MAR, Accordion, Recent Results and Cardiac Rhythm NSR.     1930: Bedside and Verbal shift change report given to JuneRN and SLADE Carter (oncoming nurse) by [address] [place] [address] (offgoing nurse). Report included the following information SBAR, Kardex, Intake/Output, MAR, Accordion, Recent Results and Cardiac Rhythm NSR.

## 2021-12-23 NOTE — PROGRESS NOTES
[facility] Cardiology Progress Note    Date of Service: 12/23/2021    Subjective:  No acute events overnight. Some mild hypotension this morning with LH. Improved later on. No LH, dyspnea, CP, ankle edema.   Has been fitted with LifeVest.     Objective:    Visit Vitals  /65   Pulse 87   Temp 98.1 °F (36.7 °C)   Resp 16   Ht 5' 9\" (1.753 m)   Wt 125.8 kg (277 lb 5.4 oz)   SpO2 98%   BMI 40.96 kg/m²         Intake/Output Summary (Last 24 hours) at 12/23/2021 1101  Last data filed at 12/23/2021 5324  Gross per 24 hour   Intake 940 ml   Output 2300 ml   Net -1360 ml        Physical Exam  GEN: NAD, appears older than stated age  HEENT: EOMI, MMM, OP clear, poor dentition with several rotten teeth  NECK: Normal JVP  CV: RRR, normal S1 and S2, no M/R/G  LUNGS: CTAB   ABD: NABS, soft, NT/ND  EXT: No edema in b/l ankles   PSYCH: Mood and affect normal  NEURO: AAO, MAEW, face symmetrical, speech intact    Current Facility-Administered Medications   Medication Dose Route Frequency    potassium chloride SR (KLOR-CON 10) tablet 40 mEq  40 mEq Oral DAILY    bumetanide (BUMEX) tablet 2 mg  2 mg Oral DAILY    insulin glargine (LANTUS) injection 40 Units  40 Units SubCUTAneous QHS    insulin lispro (HUMALOG) injection 4 Units  4 Units SubCUTAneous TID WITH MEALS    enoxaparin (LOVENOX) injection 40 mg  40 mg SubCUTAneous Q24H    carvediloL (COREG) tablet 6.25 mg  6.25 mg Oral BID WITH MEALS    losartan (COZAAR) tablet 12.5 mg  12.5 mg Oral QHS    sodium chloride (NS) flush 5-40 mL  5-40 mL IntraVENous Q8H    sodium chloride (NS) flush 5-40 mL  5-40 mL IntraVENous PRN    naloxone (NARCAN) injection 0.4 mg  0.4 mg IntraVENous PRN    atorvastatin (LIPITOR) tablet 40 mg  40 mg Oral DAILY    glucose chewable tablet 16 g  4 Tablet Oral PRN    dextrose (D50W) injection syrg 12.5-25 g  25-50 mL IntraVENous PRN    glucagon (GLUCAGEN) injection 1 mg  1 mg IntraMUSCular PRN    insulin lispro (HUMALOG) injection   SubCUTAneous AC&HS       Data Reviewed:  Recent Labs     12/23/21 0432 12/22/21 0418 12/21/21  1822    134* 134*   K 3.1* 2.9* 3.4*   CL 98 98 98   CO2 31 30 28   * 116* 128*   BUN 23* 18 20   CREA 1.22 1.20 1.30   CA 8.9 9.1 9.1   MG 2.0 1.9  --    PHOS 4.7  --   --    ALB  --   --  3.6   ALT  --   --  130*     Recent Labs     12/23/21  0434 12/22/21 0418 12/21/21 0422   WBC 15.7* 13.7* 15.4*   HGB 16.9 17.4* 15.9   HCT 49.1 49.7 46.3    332 321     No results found for: SDES  Lab Results   Component Value Date/Time    Culture result: NO GROWTH 5 DAYS 12/16/2021 12:34 PM       All Cardiac Markers in the last 24 hours:  No results found for: CPK, CK, CKMMB, CKMB, RCK3, CKMBT, CKMBPOC, CKNDX, CKND1, MISAEL, TROPT, TROIQ, SIDRA, TROPT, TNIPOC, BNP, BNPP, BNPNT    Telemetry (personally reviewed): normal sinus rhythm    Assessment:  1. New diagnosis of acute combined systolic and diastolic CHF with EF 62-20%, NYHA class IV on admission   - Essentially normal epicardial coronary arteries with changes in the distal/apical vessels c/w cardiomyopathy   - NICM from longstanding HTN, DM2, obesity, metabolic syndrome  2. Dyspnea, likely due to #1; resolved  3. HTN  4. DM2 with other circulatory complications  5. Morbid obesity  6. Moderate/severe RV hypokinesis  7. Restrictive diastolic dysfunction  8.  Mild mitral regurgitation    Plan:  - Discontinued milrinone 12/22/21  - Hold diuresis today (transient hypotension)  - Start bumetanide 2 mg po daily tomorrow  - Stop carvedilol  - Start metoprolol succinate 25 mg po daily tomorrow  - Continue losartan 12.5 mg qhs  - No BP room for ARNI/MRA  - Will add SGLT2i as an outpatient; prefer dapagliflozin 10 mg daily  - Borderline hypotension prohibits increasing HF therapies at this time  - Wearing LifeVest and has been instructed on use  - Continue potassium chloride 40 mEq daily on discharge  - Follow-up next week with my colleague as outpatient; check BMP at that time  - Recommended that he see a dentist to address his rotton teeth  - Discussed with care manager    Thank you for the opportunity to participate in the care of Alexandre Mccoy and please do not hesitate to contact us should you have any questions. Signed:  Dilip Ludwig.  Burnard Dakin, [address] [place] [address] [facility]  12/23/21

## 2021-12-23 NOTE — DISCHARGE SUMMARY
Hospitalist Discharge Summary     Patient ID:  Afua Mcelroy  [id number]  46 y.o.  1970  12/16/2021    PCP on record: None    Admit date: 12/16/2021  Discharge date and time: 12/23/2021    DISCHARGE DIAGNOSIS:    Acute new onset systolic congestive heart failure POA - EF 15-20% NICM  New onset type 2 diabetes with hyperglycemia - A1c 10%  Hypertension  History of brain aneurysm    CONSULTATIONS:  IP CONSULT TO CARDIOLOGY    Excerpted HPI from H&P of Artie Guzman MD:  Afua Mcelroy is a 46 y.o. male who presents with dyspnea   49-year-old male, vaccinated for Covid, presents with complaints of 1 week dyspnea on exertion associated with fatigue.  Patient reports [facility] night and was advised to come to the emergency department for abnormal EKG. [address] work from [facility] significant for WBC of 14, glucose of 327.  Patient denies any history of high blood sugar, diabetes.  denied CHF, denied cad. Not taking any medications at home. Denies fever, chills, nausea, vomiting, diarrhea, constipation.  Denies chest pain.  Covid test at [facility] last night pending.  Denies leg swelling, asymmetry.  Denies hemoptysis.  Denies history of DVT/PE.  Denies any recent immobilization/trauma. In ER, noticed dyspnea after mild exertion, and difficulty to lie flat due to sob     ______________________________________________________________________  DISCHARGE SUMMARY/HOSPITAL COURSE:  for full details see H&P, daily progress notes, labs, consult notes. Acute new onset systolic congestive heart failure POA - EF 15-20% NICM  - s/p LHC/RHC without significant CAD   - Continue coreg, losartan  - May begin [facility] inpatient vs. O/p per cards  - failed intermittent lasix early in hospitalization   -S/p Bumex and milrinone drip, started on Bumex 2 mg daily. Discussed with cardiologycleared by cardiology and home medications to be consulted.   -Patient got LifeVest on discharge     New onset type 2 diabetes with hyperglycemia - A1c 10%  - Continue 40U lantus daily   - DC alogliptin, we should not be using oral agents in the hospital   -Discharged on dapagliflozin 10 mg daily.  - May also introduce Metformin on discharge  -Schedule mealtime insulin 4U   -Diabetes education appreciated  - Pt will need insulin, supplies, etc. On DC     Hypertension:  -Blood pressure better controlled with Coreg and losartan  - monitor      History of brain aneurysm:  -Status post clipping in  1997      _______________________________________________________________________  Patient seen and examined by me on discharge day. Pertinent Findings:  Gen:    Not in distress  Chest: Clear lungs  CVS:   Regular rhythm. No edema  Abd:  Soft, not distended, not tender  Neuro:  Alert,   _______________________________________________________________________  DISCHARGE MEDICATIONS:   Current Discharge Medication List      START taking these medications    Details   atorvastatin (LIPITOR) 40 mg tablet Take 1 Tablet by mouth daily for 30 days. Qty: 30 Tablet, Refills: 0  Start date: 12/24/2021, End date: 1/23/2022      bumetanide (BUMEX) 2 mg tablet Take 1 Tablet by mouth daily for 30 days. Qty: 30 Tablet, Refills: 0  Start date: 12/24/2021, End date: 1/23/2022      insulin glargine (LANTUS) 100 unit/mL injection 40 Units by SubCUTAneous route nightly for 30 days. Qty: 12 mL, Refills: 0  Start date: 12/23/2021, End date: 1/22/2022      insulin lispro (HumaLOG Ramos KwikPen U-100) 100 unit/mL inph 4 Units by SubCUTAneous route three (3) times daily (with meals) for 30 days. Qty: 40 mL, Refills: 1  Start date: 12/23/2021, End date: 1/22/2022      losartan (COZAAR) 25 mg tablet Take 0.5 Tablets by mouth nightly for 30 days. Qty: 15 Tablet, Refills: 0  Start date: 12/24/2021, End date: 1/23/2022      metoprolol succinate (TOPROL-XL) 25 mg XL tablet Take 1 Tablet by mouth daily for 30 days.   Qty: 30 Tablet, Refills: 0  Start date: 12/24/2021, End date: 1/23/2022      potassium chloride SR (K-TAB) 20 mEq tablet Take 2 Tablets by mouth daily for 30 doses. Qty: 60 Tablet, Refills: 0  Start date: 12/24/2021, End date: 1/23/2022               Patient Follow Up Instructions: Activity: Activity as tolerated  Diet: Diabetic Diet  Wound Care: None needed    If you have questions regarding the hospital related prescriptions or hospital related issues please call [address] [facility] at . You can always direct your questions to your primary care doctor if you are unable to reach your hospital physician; your PCP works as an extension of your hospital doctor just like your hospital doctor is an extension of your PCP for your [facility] [address] [facility]. If you experience any of the following symptoms then please call your primary care physician or return to the emergency room if you cannot get hold of your doctor:  Fever, chills, nausea, vomiting, diarrhea, change in mentation, falling, bleeding, shortness of breath    Follow-up Information     Follow up With Specialties Details Why Contact Info Additional Information    [place] Call after discharge to discuss enrollment in OP program for  [facility] [address] [place] [address],[address] [place], [place] [address], [address]. Please arrive 15 minutes prior to your appointment time and you will register in the [facility] [address], [address], on the first floor of the [address] [place] [address]. Telephone: [phone] Fax: [phone] Driving directions To [facility] [place]. Building: Driving WEST on [address], take [address] to [employer]. Turn left onto [address], then turn right into [address] on [address], take exit [address] [facility]. Turn right at the end of the exit ramp.  Turn left onto [address], then turn right into [employer] lot.    Dr. Fani Flores  On 12/27/2021 Cardiology follow up appointment on Monday, 12/27/21 at 10:30 AM. [facility]  [address], [address].  [phone]     None    None (545) Patient stated that they have no PCP           ________________________________________________________________    Risk of deterioration: Low    Condition at Discharge:  Stable  __________________________________________________________________    Disposition  Home with family, no needs    ____________________________________________________________________    Code Status: Full Code  ___________________________________________________________________      Total time in minutes spent coordinating this discharge (includes going over instructions, follow-up, prescriptions, and preparing report for sign off to her PCP) :  >30 minutes    Signed:  Salvador Burns MD

## 2021-12-23 NOTE — NURSE NAVIGATOR
In anticipation of potential discharge,  NN spoke with Pat at [facility] and scheduled patient with Dr. Artemio Mendoza on 12/27/21 at 10:30 AM.  Details on AVS.

## 2021-12-23 NOTE — PROGRESS NOTES
Charting and patient care of [facility] by Reuben Cade RN  from 1239 to 7000 was supervised and reviewed by this RN.     Chacorta Jernigan RN

## 2021-12-23 NOTE — PROGRESS NOTES
1230: AVS signed and reviewed with pt. Copy on chart. Problem: Diabetes Self-Management  Goal: *Disease process and treatment process  Description: Define diabetes and identify own type of diabetes; list 3 options for treating diabetes. Outcome: Resolved/Met  Goal: *Incorporating nutritional management into lifestyle  Description: Describe effect of type, amount and timing of food on blood glucose; list 3 methods for planning meals. Outcome: Resolved/Met  Goal: *Incorporating physical activity into lifestyle  Description: State effect of exercise on blood glucose levels. Outcome: Resolved/Met  Goal: *Developing strategies to promote health/change behavior  Description: Define the ABC's of diabetes; identify appropriate screenings, schedule and personal plan for screenings. Outcome: Resolved/Met  Goal: *Using medications safely  Description: State effect of diabetes medications on diabetes; name diabetes medication taking, action and side effects. Outcome: Resolved/Met  Goal: *Monitoring blood glucose, interpreting and using results  Description: Identify recommended blood glucose targets  and personal targets. Outcome: Resolved/Met  Goal: *Prevention, detection, treatment of acute complications  Description: List symptoms of hyper- and hypoglycemia; describe how to treat low blood sugar and actions for lowering  high blood glucose level. Outcome: Resolved/Met  Goal: *Prevention, detection and treatment of chronic complications  Description: Define the natural course of diabetes and describe the relationship of blood glucose levels to long term complications of diabetes.   Outcome: Resolved/Met  Goal: *Developing strategies to address psychosocial issues  Description: Describe feelings about living with diabetes; identify support needed and support network  Outcome: Resolved/Met  Goal: *Insulin pump training  Outcome: Resolved/Met  Goal: *Sick day guidelines  Outcome: Resolved/Met  Goal: *Patient Specific Goal (EDIT GOAL, INSERT TEXT)  Outcome: Resolved/Met

## 2021-12-23 NOTE — PROGRESS NOTES
1930:Bedside and Verbal shift change report given to Titus Lozada and June RN (oncoming nurse) by Ronni Seip RN (offgoing nurse). Report included the following information SBAR, Kardex, ED Summary, Recent Results and Cardiac Rhythm sinus rhythm. 0720: held bumex re low BP 86/39    0730: Bedside and Verbal shift change report given to [facility] RN (oncoming nurse) by Isidro Escobedo RN (offgoing nurse). Report included the following information SBAR, Kardex, ED Summary and Cardiac Rhythm sinus rhythm. Problem: Diabetes Self-Management  Goal: *Disease process and treatment process  Description: Define diabetes and identify own type of diabetes; list 3 options for treating diabetes. Outcome: Progressing Towards Goal  Goal: *Incorporating nutritional management into lifestyle  Description: Describe effect of type, amount and timing of food on blood glucose; list 3 methods for planning meals. Outcome: Progressing Towards Goal  Goal: *Using medications safely  Description: State effect of diabetes medications on diabetes; name diabetes medication taking, action and side effects. Outcome: Progressing Towards Goal  Goal: *Monitoring blood glucose, interpreting and using results  Description: Identify recommended blood glucose targets  and personal targets. Outcome: Progressing Towards Goal     Problem: Falls - Risk of  Goal: *Absence of Falls  Description: Document Walter Brown Fall Risk and appropriate interventions in the flowsheet.   Outcome: Progressing Towards Goal  Note: Fall Risk Interventions:            Medication Interventions: Evaluate medications/consider consulting pharmacy                   Problem: Patient Education: Go to Patient Education Activity  Goal: Patient/Family Education  Outcome: Progressing Towards Goal

## 2021-12-23 NOTE — DISCHARGE INSTRUCTIONS
HOSPITALIST DISCHARGE INSTRUCTIONS    NAME: Melita Cartwright   :  1970   MRN:  [id number]     Date/Time:  2021 12:51 PM    ADMIT DATE: 2021   DISCHARGE DATE: 2021     Attending Physician: Abdias Holloway MD    DISCHARGE DIAGNOSIS:  Acute new onset systolic congestive heart failure POA - EF 15-20% NICM  New onset type 2 diabetes with hyperglycemia - A1c 10%  Hypertension  History of brain aneurysm    MEDICATIONS:  See above    · It is important that you take the medication exactly as they are prescribed. · Keep your medication in the bottles provided by the pharmacist and keep a list of the medication names, dosages, and times to be taken in your wallet. · Do not take other medications without consulting your doctor. Pain Management: per above medications    What to do at Home    Recommended diet:  Diabetic Diet    Recommended activity: Activity as tolerated    If you have questions regarding the hospital related prescriptions or hospital related issues please call [facility] at . You can always direct your questions to your primary care doctor if you are unable to reach your hospital physician; your PCP works as an extension of your hospital doctor just like your hospital doctor is an extension of your PCP for your time at [facility]. If you experience any of the following symptoms then please call your primary care physician or return to the emergency room if you cannot get hold of your doctor:  Fever, chills, nausea, vomiting, diarrhea, change in mentation, falling, bleeding, shortness of breath    Additional Instructions:      Bring these papers with you to your follow up appointments. The papers will help your doctors be sure to continue the care plan from the hospital.              Information obtained by :  I understand that if any problems occur once I am at home I am to contact my physician.     I understand and acknowledge receipt of the instructions indicated above.                                                                                                                                            Physician's or R.N.'s Signature                                                                  Date/Time                                                                                                                                              Patient or Representative Signature                                                          Da

## 2021-12-24 ENCOUNTER — TELEPHONE (OUTPATIENT)
Dept: CASE MANAGEMENT | Age: 51
End: 2021-12-24

## 2021-12-24 NOTE — TELEPHONE ENCOUNTER
Attempted to call patient. Message left with no identifiers. Received return call from Mr. Danay Murphy. He has gotten all of his prescriptions. He has purchased a glucometer from [employer] and has an [place] that allows him to track his blood sugar and weights. He is aware of daily weights. Reviewed signs and symptoms of heart failure. He is aware of his follow up visit on the 27th with .

## 2021-12-30 ENCOUNTER — CLINICAL SUPPORT (OUTPATIENT)
Dept: DIABETES SERVICES | Age: 51
End: 2021-12-30
Payer: COMMERCIAL

## 2021-12-30 DIAGNOSIS — E11.65 TYPE 2 DIABETES MELLITUS WITH HYPERGLYCEMIA, WITHOUT LONG-TERM CURRENT USE OF INSULIN (HCC): Primary | ICD-10-CM

## 2021-12-30 PROCEDURE — G0108 DIAB MANAGE TRN  PER INDIV: HCPCS | Performed by: DIETITIAN, REGISTERED

## 2021-12-30 NOTE — PROGRESS NOTES
[facility]  Diabetes Self-Management Education & Support Program  Pre-program Assessment    Reason for Referral: new onset DM  Referral Source: Mert Daigle MD  Services requested: DSMES    ASSESSMENT    From my perspective, the participant would benefit from [facility] specifically related to Reducing risks, Healthy eating, Monitoring, Physical activity, Taking medications, Healthy coping and Problem solving. Will adapt DSMES program to build on participant's skills score, confidence score and strengths in preparedness score as noted in the Diabetes Skills, Confidence, and Preparedness Index. During the program, we will focus on providing DSMES that specifically addresses participant's interest in Reducing risks, Healthy eating, Monitoring, Physical activity, Taking medications, Healthy coping and Problem solving, as shown by their reported readiness to change. The participant would be best served by attending weekly group class series. Diabetes Self-Management Education Follow-up Visit: 1/4/22       Clinical Presentation  Gm Bermeo is a 46 y.o. White male referred for diabetes self-management education. Participant has Type 2 DM on insulin for <1 year. Family history positive for diabetes. Patient reports not receiving DSMES services in the past. Most recent A1c value:   Lab Results   Component Value Date/Time    Hemoglobin A1c 10.0 (H) 12/17/2021 02:27 AM       Diabetes-related medical history:  Acute complications  Neurological complications  Microvascular disease  Macrovascular disease  CAD  Other associated conditions         Diabetes-related medications:  Current dosing:   Key Antihyperglycemic Medications             insulin lispro (HumaLOG Ramos KwikPen U-100) 100 unit/mL inph 4 Units by SubCUTAneous route three (3) times daily (with meals) for 30 days. dapagliflozin (FARXIGA) 10 mg tab tablet Take 1 Tablet by mouth daily for 30 days.  Indications: type 2 diabetes mellitus    insulin glargine (LANTUS) 100 unit/mL injection 36 Units by SubCUTAneous route nightly for 30 days. Blood Pressure Management  Key ACE/ARB Medications             losartan (COZAAR) 25 mg tablet Take 0.5 Tablets by mouth nightly for 30 days. Lipid Management  Key Antihyperlipidemia Meds             atorvastatin (LIPITOR) 40 mg tablet Take 1 Tablet by mouth daily for 30 days. Clot Prevention  Key Anti-Platelet Anticoagulant Meds     The patient is on no antiplatelet meds or anticoagulants. Learning Assessment  Learning objectives Educator assessment (12/30/2021)   Diabetes Disease Process  The participant can   A) describe diabetes in basic terms;   B) state the type of diabetes they have; &   C) state accepted blood glucose targets. Healthy Eating  The participant can   A) identify carbohydrate foods; &   B) accurately read food labels. Being Active  The participant can  A) state the benefits of physical activity;  B) report their current PA practices;  C) identify PA they would consider incorporating in their lives; &  D) develop an implementation plan. Monitoring  The participant can  A) operate their blood glucose meter; &  B) describe how they log their blood glucoses to share with their provider. Taking Medications  The participant can  A) name their diabetes medications;  B) state the purpose and dose;  C) note side effects; &  D) describe proper storage, disposal & transport (if appropriate). Healthy Coping  The participant can    A) describe their response to diabetes diagnosis; B) describe their specific coping mechanisms;  C) identify supportive people and/or other resources that positively support their diabetes self-care and health. Reducing Risks  The participant can describe the preventive measures used by providers to promote health and prevent diabetes complications.      Problem Solving  The participant can   A) identify signs, symptoms & treatment of hypoglycemia;   B) identify signs, symptoms & treatment of hyperglycemia;  C) describe their sick day plan; &  D) identify BG patterns to discuss with their provider. Yes  Yes  No        Yes  Yes, needs review        Yes  Yes  No  No        Yes  No          Yes  Yes  No  Yes, needs review      Yes  No  Yes        No          No  No  No  No     Characteristics to Learning   Barriers to Learning   [] Cognitive loss  [] Mental retardation   [] Intellectual delay/cognitive impairment  [] Psychiatric disorder  [] Visually impaired  [] Hearing loss                 [] Low literacy (difficulty with written text)  [] Low numeracy (difficulty with mathematical information  [] Low health literacy (difficulty with understanding health information & services  [] Language  [] Functional limitation  [] Pain   [] Financial  [] Transportation  [x] None   Favorite Ways to Learn   [x] Lecture  [x] Slides  [] Reading [] Video-Internet  [] Cassettes/CDs/MP3's  [] Interactive Small Groups [x] Other - tactile, hands on       Behavioral Assessment  Current self-care practices  Educator assessment (12/30/2021)   Healthy Eating  Current practices  24-hour Dietary Recall:  Breakfast: oatmeal - plain/with light/fit Greek - blueberries and will have egg - boiled with almond flour/seasoning/tumeric  Lunch: greens, chicken/turkey/salmon/tuna, goat cheese 1 oz, lavage wrap 30g. +/- fruit but usually no fruit. Dinner: similar to lunch. Snacks: avoiding in the past. Potato/corn chips, popcorn, fruit in the past  Beverages: coffee-black, water, unsweetened tea, seltzer water.   Alcohol: none     Would benefit from DSMES related to Healthy Eating: Yes      Eats a carbohydrate controlled diet: No      Stage of change: Action   Being Active  Current practices  How many days during the past week have you performed physical activity where your heart beats faster and your breathing is harder than normal for 30 minutes or more?  0 days    How many days in a typical week do you perform activity such as this?  5 days     Would benefit from [facility] related to Being Active: Yes      Exercises 150 minutes/week: Yes, physically active jobs      Stage of change: Action     Monitoring  Current practices  Do you monitor your blood sugar? Yes    How often do you monitor? 4-5 times a day    What are the range of readings? Breakfast: 133-164 mg/dL  Lunch: 136-174 mg/dL  Dinner: 129-175 mg/dL  Bedtime: 121-213 mg/dL    Do you know your last A1c measurement? No    Do you know the meaning of the A1c? No     Would benefit from [facility] related to Monitoring: Yes      Uses BG readings to establish trends and understand BG patterns: Yes      Stage of change: Action   Taking Medication  Current practices  Do you understand what your diabetes medications do? Yes, needs review    How often do you miss doses of your diabetes medications? Never    Can you afford your diabetes medications? Yes   Would benefit from [facility] related to Taking Medication: Yes      Takes medications consistently to receive full benefit: Yes      Stage of change: Action       Healthy Coping   Current state  Diabetes Skills, Confidence and Preparedness Index: Total score: 5.2  Skills: 3.8  Confidence: 5.1  Preparedness: 7.0   Would benefit from DSMES related to Healthy Coping: Yes      Identifies specific people, organizations,etc, that actively support their diabetes self-care efforts: Yes, [employer]      Stage of change: Action     Reducing Risks  Current state  Vaccines:  Influenza:   Immunization History   Administered Date(s) Administered    Influenza Vaccine [employer]) PF (>6 Mo Flulaval, Fluarix, and >3 Yrs Afluria, Fluzone 60911) 12/23/2021       Pneumococcal: There is no immunization history for the selected administration types on file for this patient.      Hepatitis: There is no immunization history for the selected administration types on file for this patient. Examinations:  Diabetic Foot and Eye Exam HM Status   Topic Date Due    Eye Exam  Never done    Diabetic Foot Care  12/16/2022        Dental exam: DUE  Eye exam: DUE  Foot exam: 12/15/21    Heart Protection:  BP Readings from Last 2 Encounters:   12/23/21 112/70        No results found for: LDL, LDLC, DLDLP     Kidney Protection:  No results found for: MCACR, MCA1, MCA2, MCA3, MCAU, MCAU2, MCALPOCT     Would benefit from [facility] related to Reducing Risks: Yes      Actively participates in decision-making with provider regarding secondary prevention:  No      Stage of change: Preparation   Problem Solving  Current state  Hypoglycemia Management:  What are signs and symptoms of hypoglycemia that you experience? Pt reported being unaware of s/s of hypoglycemia    How do you prevent hypoglycemia? Consistent meals/snack times    How do you treat hypoglycemia? Pt reported being unaware of how to treat hypoglycemia    Hyperglycemia Management:  What are signs and symptoms of hyperglycemia that you experience? Fatigue, feel hot/flushed    How can you prevent hyperglycemia? Take medication as instructed, Focus on carbohydrate counting/meal planning, Monitor blood glucose    Sick Day Management:  What do you do differently on sick days? Pt reported being unaware of self-management on sick days    Pattern Management:  Do you notice blood glucose patterns when you look at the readings in your meter or logbook? Yes, needs review    How do you use the blood glucose readings from your meter or logbook?  Adjust meals based on results     Would benefit from [facility] related to Problem Solving: Yes      Articulates appropriate strategies to address hypoglycemia, hyperglycemia, sick day care and BG pattern: No      Stage of change: Preparation     Note: Content derived from the American Association of Diabetes Educators' Diabetes Education Curriculum: A Guide to Successful Self-Management (3rd edition)      Karla Romero Yun Valencia on 12/30/2021 at 10:20 AM    I have personally reviewed the health record, including provider notes, laboratory data and current medications before making these care and education recommendations. The time spent in this effort is included in the total time. Total minutes: 30    [id number] for Telehealth:  Corey David was evaluated in person. Overall SCPI score: 5.2 Skills Score: 3.8  Low: Reducing Risks(Q9) Confidence Score: 5.1  Low: Blood Sugar Monitoring(Q6) Preparedness Score: 7.0  Low: Healthy Eating(Q1),Being Active(Q2),Healthy Coping(Q3),Reducing Risks(Q4),Taking Medication(Q5),Blood Sugar Monitoring(Q6),Problem Solving(Q7)  Healthy Eating Score: 5.8  Low: Skills(Q1),Confidence(Q4) Taking Medication Score: 5.0  Low: Skills(Q2) Blood Sugar Monitoring Score: 5.0  Low: Skills(Q4),Confidence(Q6) Reducing Risks Score: 3.8  Low: SXKMGJ(T7)  Problem Solving Score: 4.3  Low: Skills(Q6) Healthy Coping Score: 6.7  Low: DTOQKV(N6) Being Active Score: 6.5  Low: Confidence(Q5)    Skills/Knowledge Questions  1. I know how to plan meals that have the best balance between carbohydrates, proteins and vegetables. 5  2. I know how my diabetes medications (pills, injectables and/or insulin) work in my body. 3  3. I know when to check my blood sugar if I want to see how my body responded to a meal. 7  4. I know when to check my blood sugars to determine if my medication or insulin doses are correct. 3  5. I know what to do to prevent a low blood sugar when I exercise (either before, during, or after). 2  6. When I am sick, I know what to do differently with my diabetes management. 2  7. I know how stress can affect my diabetes management. 6  8. When I look at my blood sugars over a given week, I can explain what my blood sugar pattern is. 5  9. I know what my target levels are for A1c, blood pressure and cholesterol. 1  Confidence Questions  1.  I am confident that I can plan balanced meals and snacks. 6  2. I am confident that I can manage my stress. 7  3. I am confident that I can prevent a low blood sugar during or after exercise. 5  4. I am confident that the next time I eat out, I will be able to choose foods that best keep my blood sugars in target. 5  5. I am confident I can include exercise into my schedule. 6  6. I am confident that I can use my daily blood sugars to adjust my diet, my activity, and/or my insulin. 3  7. When something out of my normal routine happens, I am confident that I can problem-solve and keep my diabetes on track. 4  Preparedness Questions  1. Within the next month, I will begin to eat more balanced meals and snacks. 7  2. Within the next month, I will choose an exercise activity and I will start fitting it into my schedule. 7  3. Within the next month, I will make a list of stress management options that work for me. 7  4. Within the next month, I will consistently plan ahead to prevent low blood sugars. 7  5. Within the next month, I will start adjusting my insulin doses on my own. 7  6. Within the next month, I will begin making changes to my diabetes management based on my daily blood sugars (eg - eating, activity and/or insulin). 7  7. Within the next month, I will begin making changes to my diabetes management to meet my overall goals (eg - eating, activity and/or insulin).  7

## 2022-01-06 ENCOUNTER — HOSPITAL ENCOUNTER (OUTPATIENT)
Dept: CARDIAC REHAB | Age: 52
Discharge: HOME | End: 2022-01-06
Payer: COMMERCIAL

## 2022-01-06 VITALS — BODY MASS INDEX: 40.49 KG/M2 | WEIGHT: 273.4 LBS | HEIGHT: 69 IN

## 2022-01-06 PROCEDURE — 93798 PHYS/QHP OP CAR RHAB W/ECG: CPT

## 2022-01-06 NOTE — CARDIO/PULMONARY
Angel Melo  46 y.o. presented to cardiac wellness for orientation and exercise tolerance test today with a primary diagnosis of SHF EF is 15 % and he is wearing his Lifevest.. Angel Melo has a history of previous brain aneurysm/clipping 1996  Cardiac risk factors include smoking/ tobacco exposure, dyslipidemia, diabetes mellitus, obesity, hypertension, stress and these were reviewed with him. Angel Melo lives with his wife and 15year old daughter. PHQ9, depression score, is 0 and this is considered normal.  Patient admitted to dyspnea at end of   6 minute walk and was in SR/ST with occ pvcs and brief quadrigeminy. Yossi Ortiz exercise 1 day a week due to high co pay and work commitments. Angel Melo works in \[employer]\"" and leaves home at [facility] and does not return until 7pm.     An education manual was given to the patient and reviewed briefly.   Westley Terrazas RN  1/6/2022

## 2022-01-11 ENCOUNTER — APPOINTMENT (OUTPATIENT)
Dept: CARDIAC REHAB | Age: 52
End: 2022-01-11
Payer: COMMERCIAL

## 2022-01-11 ENCOUNTER — CLINICAL SUPPORT (OUTPATIENT)
Dept: DIABETES SERVICES | Age: 52
End: 2022-01-11
Payer: COMMERCIAL

## 2022-01-11 ENCOUNTER — HOSPITAL ENCOUNTER (OUTPATIENT)
Dept: CARDIAC REHAB | Age: 52
Discharge: HOME | End: 2022-01-11
Payer: COMMERCIAL

## 2022-01-11 VITALS — BODY MASS INDEX: 40.08 KG/M2 | WEIGHT: 271.4 LBS

## 2022-01-11 DIAGNOSIS — E11.65 TYPE 2 DIABETES MELLITUS WITH HYPERGLYCEMIA, WITHOUT LONG-TERM CURRENT USE OF INSULIN (HCC): Primary | ICD-10-CM

## 2022-01-11 PROCEDURE — G0109 DIAB MANAGE TRN IND/GROUP: HCPCS | Performed by: DIETITIAN, REGISTERED

## 2022-01-11 PROCEDURE — 93798 PHYS/QHP OP CAR RHAB W/ECG: CPT

## 2022-01-12 NOTE — PROGRESS NOTES
[address] [place] [address] for Diabetes Health  Diabetes Self-Management Education & Support Program    SUMMARY  Diabetes self-care management training was completed related to reducing risks. The participant will return on 1/18/22 to continue DSMES related to healthy eating and monitoring. The participant did not identify SMART Goal(s) and will practice knowledge and skills related to reducing risks to improve diabetes self-management. EVALUATION:  Alan Oliver attended class today and participated in learning activities, discussions and asked very good questions. He is new to DM and is eager to learn how to control his BG values. He was identifying standards that he needs to determine if completed and then to also start his care plan to track his annual exams and lab targets to decrease his risk for complications. He shared that his values are improving and he is testing routinely/not missing medications. He will also be seeing RD with cardiac rehab given his CHF. RECOMMENDATIONS:  1) begin daily foot care and self exam at home. 2) encouraged wearing footwear in home. 3) maintain your medication regimen per MD.  4) participate in cardiac rehab as directed and home activity as permitted by their group. Next provider visit is scheduled for unknown. DATE DSMES TOPIC EVALUATION     1/11/22 WHAT IS DIABETES?   a. Role of the normal pancreas in energy balance and blood glucose control   b. The defect seen in diabetes   c. Signs & symptoms of diabetes   d. Diagnosis of diabetes   e. Types of diabetes   f. Blood glucose targets in non-pregnant & non-pregnant adults       The participant knows   Their type of diabetes: Yes   The basic physiologic defect: Yes   Blood glucose targets: Yes     DATE DSMES TOPIC EVALUATION     1/11/22 HOW DO I STAY HEALTHY?   a. Prevention    Vaccinations    Preconception care (if applicable)  b.  Examinations    Eye     Foot   c. Diabetic complications' prevention    Dental health   [address] health      The participant has a personal diabetes care record to keep abreast of diabetes health Yes     The participant needs to address eye and dental care. He completed his foot exam during his hospital admission. Encouraged to review his mychart lab result/his physician based patient portal to assess if his microalbumin levels were completed. [facility] for Telehealth:    Breezy De Souza is a 46 y.o. male being evaluated in person for class. Time in appointment: 120 minutes    [address] [facility], JOOA, [facility] on 1/12/2022  An electronic signature was used to authenticate this note.

## 2022-01-18 ENCOUNTER — CLINICAL SUPPORT (OUTPATIENT)
Dept: DIABETES SERVICES | Age: 52
End: 2022-01-18
Payer: COMMERCIAL

## 2022-01-18 ENCOUNTER — HOSPITAL ENCOUNTER (OUTPATIENT)
Dept: CARDIAC REHAB | Age: 52
Discharge: HOME | End: 2022-01-18
Payer: COMMERCIAL

## 2022-01-18 VITALS — WEIGHT: 268.4 LBS | BODY MASS INDEX: 39.64 KG/M2

## 2022-01-18 DIAGNOSIS — E11.65 TYPE 2 DIABETES MELLITUS WITH HYPERGLYCEMIA, WITHOUT LONG-TERM CURRENT USE OF INSULIN (HCC): Primary | ICD-10-CM

## 2022-01-18 PROCEDURE — G0109 DIAB MANAGE TRN IND/GROUP: HCPCS | Performed by: DIETITIAN, REGISTERED

## 2022-01-18 PROCEDURE — 93798 PHYS/QHP OP CAR RHAB W/ECG: CPT

## 2022-01-18 NOTE — PROGRESS NOTES
[facility] for Diabetes Health  Diabetes Self-Management Education & Support Program  Encounter note    SUMMARY  Diabetes self-care management training was completed related to Healthy eating and Monitoring. The participant will return on January 25 to continue DSMES related to Physical activity and Taking medications. The participant did not identify SMART Goal(s): will practice knowledge and skills related to reducing risks and healthy eating and monitoring to improve diabetes self-management. EVALUATION:  Jacob Natarajan is currently testing his BG - shared that he is testing prior to meals and values are improving slowly. He will be visiting with cardiac rehab RD this pm to address his CHF diet guidelines. Demonstrated an excellent understanding of serving sizes and reading nutrition labels. Horace shared that he would like to lose 30 lb by his next birthday - suggested that his timeline may need adjusting but this could be a realistic target for next 3-6 months. RECOMMENDATIONS:  1) consider planning meals toward 45-60 g carbohydrate per meal.  2) plan for snacks as needed - bridging >5 hours between meals. 3) choose lower sodium, lean proteins. 4) continue to limit sodium per guidelines provided by cardiac rehab.  5) continue to monitor BG and share with providers. Next provider visit is scheduled for unknown. DATE DSMES TOPIC EVALUATION     1/18/2022 WHAT CAN I EAT?   a. General principles   b. Determining a healthy weight   c. Nutritional terms & tools    Healthy Plate method    Carbohydrate Counting    Reading food labels    Free apps       The participant    Uses Healthy Plate principles in constructing meals Yes   Reads food labels in choosing acceptable foods Yes    The participant would benefit from building meals to meet carbohydrate guidelines, limit snacking and careful with lean protein portions to help with promoting weight loss.  Encouraged to follow sodium guidelines provided by cardiac rehab. DATE DSMES TOPIC EVALUATION     1/18/2022 HOW CAN BLOOD GLUCOSE MONITORING HELP ME?   a. Value of blood glucose monitoring   b. Realistic expectations   c. Blood glucose monitoring targets   d. Target adjustments   e. Setting a1c & blood glucose targets with provider   f. Meter selection    g. Technique for obtaining blood droplet   h. Blood glucose testing sites   i. Determining best times to test   j. Pregnancy recommendations   k. Data sharing with provider        The participant    Can demonstrate their glucometer procedure Yes   Logs their BG readings Yes    The participant would benefit from sharing his BG results with all providers. Marina Garcia RD, [facility] on 1/18/2022 at 1:12 PM  Total minutes: [id number] Emergency Adaptations for Telehealth:  Yessy Tran was evaluated in person for class today.

## 2022-01-18 NOTE — CARDIO/PULMONARY
Cardiac Rehab Nutrition Assessment - 1:1 Evaluation           NAME: Tanya Estrada : 1970 AGE: 46 y.o. GENDER: male  CARDIAC REHAB ADMITTING DIAGNOSIS: HF    PROBLEM LIST:  Patient Active Problem List   Diagnosis Code    Dyspnea R06.00    CHF (congestive heart failure) ([facility]) I50.9     DM      LABS:   Lab Results   Component Value Date/Time    Hemoglobin A1c 10.0 (H) 2021 02:27 AM     No results found for: CHOL, CHOLPOCT, CHOLX, CHLST, CHOLV, HDL, HDLPOC, HDLP, LDL, LDLCPOC, LDLC, DLDLP, VLDLC, VLDL, TGLX, TRIGL, TRIGP, TGLPOCT, CHHD, CHHDX    SMBG multiple times a day. FBG 98 (), 106 (), 107 ()    MEDICATIONS/SUPPLEMENTS:   [unfilled]  Prior to Admission medications    Medication Sig Start Date End Date Taking? Authorizing Provider   atorvastatin (LIPITOR) 40 mg tablet Take 1 Tablet by mouth daily for 30 days. 21  Shruthi Call MD   bumetanide (BUMEX) 2 mg tablet Take 1 Tablet by mouth daily for 30 days. 21  Shruthi Call MD   insulin lispro (HumaLOG Ramos KwikPen U-100) 100 unit/mL inph 4 Units by SubCUTAneous route three (3) times daily (with meals) for 30 days. 21  Shruthi aCll MD   losartan (COZAAR) 25 mg tablet Take 0.5 Tablets by mouth nightly for 30 days. Patient taking differently: Take 25 mg by mouth nightly. 21  Shruthi Call MD   metoprolol succinate (TOPROL-XL) 25 mg XL tablet Take 1 Tablet by mouth daily for 30 days. Patient taking differently: Take 40 mg by mouth daily. 21  Shruthi Call MD   potassium chloride SR (K-TAB) 20 mEq tablet Take 2 Tablets by mouth daily for 30 doses. 21  Shruthi Call MD   dapagliflozin (FARXIGA) 10 mg tab tablet Take 1 Tablet by mouth daily for 30 days.  Indications: type 2 diabetes mellitus 21  Shruthi Call MD   insulin glargine (LANTUS) 100 unit/mL injection 36 Units by SubCUTAneous route nightly for 30 Please know that our office will communicate with you as soon as we receive completed results.  Some of our specialty labs take longer than 2 weeks to receive, thus you may receive a survey prior to your results being available.  If you should receive a survey while awaiting your results or feel that it has been longer than anticipated, please feel free to call our office to discuss.      days.  Patient taking differently: 30 Units by SubCUTAneous route nightly. 12/23/21 1/22/22  Gracie Sam MD           ANTHROPOMETRICS:    Ht Readings from Last 1 Encounters:   01/06/22 5' 9\" (1.753 m)      Wt Readings from Last 10 Encounters:   01/11/22 123.1 kg (271 lb 6.4 oz)   01/06/22 124 kg (273 lb 6.4 oz)   12/23/21 125.8 kg (277 lb 5.4 oz)      IBW:160 # +/- 10%  %IBW: 169 % +/- 10%    BMI: 40.08 kg/M2 Category: severe obesity  Waist: 52 inches    Reported Wt Hx: 30 lbs fluid loss in hospital, down another 13 lbs with diet changes; at his heaviest close to 400 lbs and lost weight but then \"fell off the wagon\" during a couple year period of emotional family events. Reported Diet Hx:     Rate Your Plate Score: 69  (Score 58-72: Making many healthy choices; 41-57: Some choices need improving 24-40: many choices need improving)    24 HOUR DIET RECALL  Breakfast Medium avocado with wilted spinach, couple of eggs and mushrooms scrambled in with the greens in olive oil spray   Lunch Cauliflower, mushrooms, salmon, chopped garlic all cooked together and served with a little melon and blueberries   Dinner Wild salmon, onion, 1.25 cups shaved brussels sprouts, mushrooms, light'n'fit plain [place] yogurt with shiitake noodles   Snacks    Beverages Black coffee, water, Bubly     Steff Hameed has begun working with a dietitian on his blood sugar control. States since his hospital stay he has \"cut out the crap\" of his diet. Used to grab whatever was available when traveling to / from work. Now plans to pack his meals. Has access to a kitchen at work. Eating more veggies than he has in a long time. Has been keeping a food diary and BG log. Also measuring portions. Plan and preps food in advance. Reading labels for sodium. He denies any barriers to maintaining changes made. Environmental/Social: David Mcrae lives with his wife and their 15year old daughter. Plans to return to work tomorrow.   Drives to work in [facility] daily. Has been taking walks. NUTRITION INTERVENTION:  Nutrition 30 minute one-on-one education & goal setting with [address] with Leonila Garzon relevant labs compared to ideals. Reviewed weight history and patient's verbalized weight goal as well as any real or perceived barriers to obtaining the goal. Collaborated with patient to set a specific short and long term weight goal.     Reviewed Rate Your Plate and conducted a verbal diet recall. Assessed for environmental, financial, psychosocial, physical and comorbidities that may impact the food and eating patterns / behaviors of [address] with patient to set specific nutrient goals as well as specific food / behavior changes that will help patient meet the overall goal of following a heart healthy eating pattern (using guidelines as set forth by the American Heart Association and modeled after healthful eating patterns as recognized by the USDA Dietary Guidelines such as DASH, Mediterranean or plant-based). Briefly reviewed with Leonila Garzon the nutrition information in the Cardiac Rehab patient education book and encouraged Leonila Garzon to read thoroughly, ask questions as needed, and use for future reference for heart healthy nutrition information. Leonila Garzon is not scheduled to participate in Cardiac Rehab group nutrition classes.           PATIENT GOALS:    Weight Goals:  Short Term Weight Goal: 250-255 lbs  Long Term Weight Goal:<200 lbs    Nutrition Goals:  Daily Recommendations:  Calories: 2000 /day  (using Doris Wayne with AF 1.3 to 1.4; - 750 for weight loss at rate of 1.5-2 lbs per week)    Saturated Fat:   Trans Fat: 0 g/day  Sodium: no more than 2000 mg/day  Fruit: 2.5 cups / day  Vegetables: 2.5 or more cups/day    Other:  - read and compare food labels  - DASH plan (include 2-3 servings low / no fat dairy and whole grains)  - continue portion control  - continue planning & prepping food in advance  - continue food diary        Questions addressed. Follow-up plans discussed. Jennifer Guevara verbalized understanding.             Nieves Esparza RD

## 2022-01-25 ENCOUNTER — HOSPITAL ENCOUNTER (OUTPATIENT)
Dept: CARDIAC REHAB | Age: 52
Discharge: HOME | End: 2022-01-25
Payer: COMMERCIAL

## 2022-01-25 ENCOUNTER — CLINICAL SUPPORT (OUTPATIENT)
Dept: DIABETES SERVICES | Age: 52
End: 2022-01-25
Payer: COMMERCIAL

## 2022-01-25 VITALS — WEIGHT: 267.4 LBS | BODY MASS INDEX: 39.49 KG/M2

## 2022-01-25 DIAGNOSIS — E11.65 TYPE 2 DIABETES MELLITUS WITH HYPERGLYCEMIA, WITHOUT LONG-TERM CURRENT USE OF INSULIN (HCC): Primary | ICD-10-CM

## 2022-01-25 PROCEDURE — G0109 DIAB MANAGE TRN IND/GROUP: HCPCS | Performed by: DIETITIAN, REGISTERED

## 2022-01-25 PROCEDURE — 93798 PHYS/QHP OP CAR RHAB W/ECG: CPT

## 2022-01-26 NOTE — PROGRESS NOTES
[facility]  Diabetes Self-Management Education & Support Program  Encounter note    SUMMARY  Diabetes self-care management training was completed in class related to Physical activity and Taking medications. The participant will return on February 1 to continue DSMES related to Healthy coping and Problem solving. The participant did not identify SMART Goal(s):  and will practice knowledge and skills related to reducing risks, healthy eating and monitoring and being active and medications to improve diabetes self-management. EVALUATION:  Reviewed SMART goal setting with Melissa again during class. He shared his experiences and questions. He has been evaluated by cardiac rehab and has been encouraged to be active within their guidelines. Melissa confirmed that he is aware of the side effects of his Katja Silveira along with his insulin. He is developing a hypoglycemia plan for when he returns to work. RECOMMENDATIONS:  1) continue with current practices for injections, storage and disposal of insulins. 2) instructed to have hypoglycemia treatment and proper insulin storage plan for travel as he commutes to [place]. 3) instructed to use his cardiac rehab guidelines for his exercise at this time. Next provider visit is scheduled for unknown         DATE DSMES TOPIC EVALUATION     1/25/22 HOW DO MY DIABETES MEDICATIONS WORK?   a. Type 1 medications & methods    Insulin injections    Injection sites   b. Type 2 medications    Oral agents    GLP-1 agonists   c. Hypoglycemia symptoms & treatment    Glucagon emergency kits   d. General guidance regarding insulin use whether Type 1, 2 or gestational diabetes    Storage of insulin    Disposal     Traveling with medications   e. Barriers to medication adherence      The participant    Can describe the expected action & side effects of prescribed diabetes medications Yes.  Can demonstrate injection technique (if applicable) Yes.     The participant needs to address making sure he has hypoglycemia treatment especially as he will return to work and he is more physically active than his current status. Reviewed insulin tips and techniques w/in class today. DATE DSMES TOPIC EVALUATION     1/25/22 HOW DOES PHYSICAL ACTIVITY AFFECT MY DIABETES?   a. Benefits of physical activity   b. Beginning a program of physical activity    Walking    Pedometers    Goal setting   c. Structured physical activity program    Aerobic activity    Resistance    Flexibility    Balance   d. Physical activity program progression   e. Safety issues   f. Barriers to physical activity   g. Facilitators of physical activity        The participant has established a regular physical activity plan Yes    The participant would benefit from continuing to use cardiac rehab guidelines for his parameters for exercise/activity at this time. Jerrica Jordan RD on 1/26/2022 at 9:24 AM    Total minutes: [address] [employer] for Telehealth:  Ryan Lynch, was evaluated in person for today's class.

## 2022-02-01 ENCOUNTER — CLINICAL SUPPORT (OUTPATIENT)
Dept: DIABETES SERVICES | Age: 52
End: 2022-02-01
Payer: COMMERCIAL

## 2022-02-01 ENCOUNTER — HOSPITAL ENCOUNTER (OUTPATIENT)
Dept: CARDIAC REHAB | Age: 52
Discharge: HOME | End: 2022-02-01
Payer: COMMERCIAL

## 2022-02-01 VITALS — WEIGHT: 265 LBS | BODY MASS INDEX: 39.13 KG/M2

## 2022-02-01 DIAGNOSIS — E11.65 TYPE 2 DIABETES MELLITUS WITH HYPERGLYCEMIA, WITHOUT LONG-TERM CURRENT USE OF INSULIN (HCC): Primary | ICD-10-CM

## 2022-02-01 PROCEDURE — 93798 PHYS/QHP OP CAR RHAB W/ECG: CPT

## 2022-02-01 PROCEDURE — G0109 DIAB MANAGE TRN IND/GROUP: HCPCS | Performed by: DIETITIAN, REGISTERED

## 2022-02-01 NOTE — CARDIO/PULMONARY
Edmundo Gamble  Completed phase II cardiac rehab and attended 5 sessions from 1/6/22 - 2/1/22. Edmundo Gamble is interested in maintaining optimal health and will work with Dr. Arabella Valle. Edmundo Gamble has improved his endurance and stamina through regular exercise and lost 8 lbs. Blood pressure is 130/78 and is WNL. He has also improved his nutrition, [employer] and depression scores and these were reviewed with patient. Edmundo Gamble plans to continue exercising at home, at a gym ([employer]) and has set revised goals that include cardio equipment, light weights and walking 3 to 5 times a week for at least 30 minutes.     Salvador Rojas RN  2/1/2022

## 2022-02-01 NOTE — PROGRESS NOTES
[facility]  Diabetes Self-Management Education & Support Program  Encounter note    SUMMARY  Diabetes self-care management training was completed related to Healthy coping and Problem solving. The participant will return on March 16 to continue DSMES related to post program assessment and follow up. The participant did identify SMART Goal(s): see below, and will practice knowledge and skills related to reducing risks, healthy eating and monitoring, being active and medications and healthy coping and problem solving to improve diabetes self-management. EVALUATION:  Marvin Dennison has been building his support network and using a variety of coping methods with managing his new diagnoses of diabetes and CHF. He will be wrapping up his time with cardiac rehab today. Gleaned resource for CHF support from classmate today and has been using the ADA website - food hub for exploring recipes and menu options. He has been expanding his goals and identified problem areas with his workdays for delayed mealtimes and \"things getting off schedule\". RECOMMENDATIONS:  1) consider working on disaster and sick day plans also addressing guidelines provided by your CHF team - especially with your fluid restrictions. Next provider visit is scheduled for unknown. SMART GOAL(S)  1. Engage in 10 minutes of activity following meals in addition to routine physcal activity. ACHIEVEMENT OF GOAL(S)  [] 0-24%     [] 25-49%     [] 50-74%     [] %         DATE DSMES TOPIC EVALUATION     2/1/2022 HOW DO I FIND SUPPORT TO TACKLE THIS CONDITION?   a. Normal responses to diabetes diagnosis or complication    Shock    Anger & resentment    Guilt/self-blame    Sadness & worry    Depression     Anxiety    Pregnancy   b.  Constructive strategies to normal responses     Exploring feelings & attitudes    Motivation: Cost versus benefits analysis    Problem-solving: Chain analysis    Obtaining support: Communicating   i. Family & friends   ii. Health team   iii. Community   c. Stress    Symptoms    Managing stress    Fill your tool kit        The participant can identify people that support them in caring for their diabetes health:  Wife, daughter - now playing games together versus snacking together; she is also eating her snacks away from her dad because some of her choices are tempting foods for him. The participant would like to pursue the following in keeping themselves healthy after completing the program:  Currently using [address] but is interested in CHF support group mentioned in class. The participant would benefit from continuing to explore his systems for support. DATE DSMES TOPIC EVALUATION     2/1/2022 HOW DO I FIGURE OUT WHAT'S INFLUENCING MY BLOOD GLUCOSES?   a. Problem solving using SOAR    Set goals    Sort options    Arrive at a plan    Reaffirm plan   b. Common problems in diabetes self-care    Hypoglycemia    Hyperglycemia    Sick days   c. Pattern management   d. Disaster preparedness plan        The participant has an action plan for    Hypoglycemia Yes   Hyperglycemia Yes   Sick days Yes   During disasters Yes    The participant would benefit from using SOAR to arrive at solutions for preventing delays/disruptions to his diabetes management routine during his work day compared to when he is home - \"things happen and then I am getting lunch late\". Possible solutions: hand held food choices \"that I can take to where ever I am working at that point in my day\". Vishal Wu RD, [facility] [place] on 2/1/2022 at 11:41 AM    Total minutes: [address] [employer] for Telehealth:  Jaime Quintero, was evaluated and educated in person during class today.

## 2022-03-16 ENCOUNTER — CLINICAL SUPPORT (OUTPATIENT)
Dept: DIABETES SERVICES | Age: 52
End: 2022-03-16
Payer: COMMERCIAL

## 2022-03-16 DIAGNOSIS — E11.65 TYPE 2 DIABETES MELLITUS WITH HYPERGLYCEMIA, WITHOUT LONG-TERM CURRENT USE OF INSULIN (HCC): Primary | ICD-10-CM

## 2022-03-16 PROCEDURE — G0108 DIAB MANAGE TRN  PER INDIV: HCPCS | Performed by: DIETITIAN, REGISTERED

## 2022-03-16 NOTE — PROGRESS NOTES
[facility]  Diabetes Self-Management Education & Support Program  Post-program Evaluation    EVALUATION @ COMPLETION OF [address] completed 8 hours of diabetes self-management education. The participant acquired new knowledge and demonstrated new skills during the program.     The participant worked on the following SMART GOAL(s) to improve their diabetes self-management:    1. Engage in 10 minutes of activity following meals in addition to routine physcal activity. ACHIEVEMENT OF GOAL(S)  [] 0-24%     [] 25-49%     [] 50-74%     [x] %  The participant's pre-program A1c was   Lab Results   Component Value Date/Time    Hemoglobin A1c 10.0 (H) 12/17/2021 02:27 AM   . The post-evaluation A1c is pending follow up with PCP. The participant improved their Diabetes Skills, Confidence and Preparedness Index (scored out of 7): Total score: 7.0  Skills: 7.0  Confidence: 7.0  Preparedness: 7.0    FINAL RECOMMENDATIONS:  Jeancarlos Ignacio needs to establish a PCP for his overall care and monitoring of his Type 2 DM including his A1c management. Strongly encouraged him to make appointment within the next 2 weeks. Also encouraged follow up with outstanding vaccines per guidelines for diabetes along with dental visit and dilated eye exam.      Next provider visit is scheduled for pending.        Joel Valentino RD, [facility] on 3/16/2022 at 3:55 PM    Metric Patient's responses (3/16/2022) Areas for improvement     Healthy Eating       The participant is using the Healthy Plate to control carbohydrate intake Yes    The participant reads food labels accurately Yes          Being Active       The participant performs physical activity where your heart beats faster and your breathing is harder than normal for 30 minutes or more?  0 days    In a typical week, the participant performs physical activity 6 days          Monitoring   The participant is monitoring their blood sugars? Yes    The participant is monitoring 4x/day    Blood glucoses are ranging:   Breakfast: mid 80's mg/dL  Lunch: 90- to low 100 mg/dL  Dinner: high 90's120 mg/dL  Bedtime: 90's mg/dL    The participant improved their A1c not repeated as of our visit. The participant understands the meaning of the A1c Yes          Taking Medications   The participant understands what their diabetes medications do Yes    The participant can afford your diabetes medications Yes    The participant does not miss doses of their diabetes medications Never          Healthy Coping     The participant feels supported by family, friends and others related to their diabetes self-care practices Yes    The participant plans on utilizing the following community resources after completion of the program: [address] and [facility] support group        Reducing Risks   Vaccines:  Influenza:   Immunization History   Administered Date(s) Administered    Influenza Vaccine [place]) PF (>6 Mo Flulaval, Fluarix, and >3 Yrs [address] [place] [address], [place] [id number]) 12/23/2021       Pneumococcal: There is no immunization history for the selected administration types on file for this patient. Hepatitis: There is no immunization history for the selected administration types on file for this patient. Examinations:  Diabetic Foot and Eye Exam HM Status   Topic Date Due    Eye Exam  Never done    Diabetic Foot Care  12/16/2022        Dental exam: DUE  Eye exam: DUE  Foot exam: 12/15/21    Heart Protection:  BP Readings from Last 2 Encounters:   12/23/21 112/70        No results found for: LDL, LDLC, DLDLP     Key Anti-Platelet Anticoagulant Meds     The patient is on no antiplatelet meds or anticoagulants.            Kidney Protection:  No results found for: Lalo Paz, [facility], [address], MCAU, [address] , MCALPOCT   The participant would benefit from additional attention to:    Vaccines:  [] Influenza  [x] Pneumococcal  [] Hepatitis    Examinations:  [x] Dilated eye exam  [x] Dental exam  [] Foot exam    Other:  [] Reviewing long-term complications     Problem Solving   Hypoglycemia Management:  The participant knows the signs and symptoms of hypoglycemia Dizziness/light-headedness, Blurred vision, Rapid heartrate, balance issues    The participant knows how to prevent hypoglycemia? Consistent meals/snack times, Take medications as instructed and Monitor blood glucose before exercise    The participant knows how to treat hypoglycemia? Rule of 15    Hyperglycemia Management:  The participant knows their signs and symptoms of hyperglycemia Extreme thirst, Frequent urination, Fatigue, Blurred vision    The participant knows how to prevent hyperglycemia? Take medication as instructed, Focus on carbohydrate counting/meal planning, Engage in regular physically active, Monitor blood glucose    Sick Day Management:  The participant knows what to do differently on sick days? Stay hydrated, Check blood glucose every 2-4 hours, Eat meals, soft foods, or drink caloric beverages every 4 hours, Take diabetes medication as instructed by provider, Take over-the-counter medications as instructed by provider    Pattern Management:  The participant can notice blood glucose patterns when looking at their blood glucose readings Yes           Note: Content derived from the American Association of Diabetes Educators' Diabetes Education Curriculum: A Guide to Successful Self-Management (3rd edition)      I have personally reviewed the health record, including provider notes, laboratory data and current medications before making these care and education recommendations. The time spent in this effort is included in the total time. Total minutes: 28    [id number] [employer] for Telehealth:  Leonlia Garzon, was evaluated in person.     Overall SCPI score: 7.0 Skills Score: 7.0  Low: Healthy Eating(Q1),Taking Medication(Q2),Blood Sugar Monitoring(Q3),Blood Sugar Monitoring(Q4),Reducing Risks(Q5),Problem Solving(Q6),Healthy Coping(Q7),Blood Sugar Monitoring(Q8),Reducing Risks(Q9) Confidence Score: 7.0  Low: Healthy Eating(Q1),Healthy Coping(Q2),Reducing Risks(Q3),Healthy Eating(Q4),Being Active(Q5),Blood Sugar Monitoring(Q6),Problem HDHQIBJ(R2) Preparedness Score: 7.0  Low: Healthy Eating(Q1),Being Active(Q2),Healthy Coping(Q3),Reducing Risks(Q4),Taking Medication(Q5),Blood Sugar Monitoring(Q6),Problem Solving(Q7)  Healthy Eating Score: 7.0  Low: Skills(Q1),Confidence(Q1),Confidence(Q4),Preparedness(Q1) Taking Medication Score: 7.0  Low: Skills(Q2),Preparedness(Q5) Blood Sugar Monitoring Score: 7.0  Low: Skills(Q3),Skills(Q4),Skills(Q8),Confidence(Q6),Preparedness(Q6) Reducing Risks Score: 7.0  Low: Skills(Q5),Skills(Q9),Confidence(Q3),Preparedness(Q4)  Problem Solving Score: 7.0  Low: Skills(Q6),Confidence(Q7),Preparedness(Q7) Healthy Coping Score: 7.0  Low: Skills(Q7),Confidence(Q2),Preparedness(Q3) Being Active Score: 7.0  Low: Confidence(Q5),Preparedness(Q2)    Skills/Knowledge Questions  1. I know how to plan meals that have the best balance between carbohydrates, proteins and vegetables. 7  2. I know how my diabetes medications (pills, injectables and/or insulin) work in my body. 7  3. I know when to check my blood sugar if I want to see how my body responded to a meal. 7  4. I know when to check my blood sugars to determine if my medication or insulin doses are correct. 7  5. I know what to do to prevent a low blood sugar when I exercise (either before, during, or after). 7  6. When I am sick, I know what to do differently with my diabetes management. 7  7. I know how stress can affect my diabetes management. 7  8. When I look at my blood sugars over a given week, I can explain what my blood sugar pattern is. 7  9. I know what my target levels are for A1c, blood pressure and cholesterol. 7  Confidence Questions  1. I am confident that I can plan balanced meals and snacks. 7  2.  I am confident that I can manage my stress. 7  3. I am confident that I can prevent a low blood sugar during or after exercise. 7  4. I am confident that the next time I eat out, I will be able to choose foods that best keep my blood sugars in target. 7  5. I am confident I can include exercise into my schedule. 7  6. I am confident that I can use my daily blood sugars to adjust my diet, my activity, and/or my insulin. 7  7. When something out of my normal routine happens, I am confident that I can problem-solve and keep my diabetes on track. 7  Preparedness Questions  1. Within the next month, I will begin to eat more balanced meals and snacks. 8  2. Within the next month, I will choose an exercise activity and I will start fitting it into my schedule. 8  3. Within the next month, I will make a list of stress management options that work for me. 8  4. Within the next month, I will consistently plan ahead to prevent low blood sugars. 8  5. Within the next month, I will start adjusting my insulin doses on my own. 7  6. Within the next month, I will begin making changes to my diabetes management based on my daily blood sugars (eg - eating, activity and/or insulin). 8  7. Within the next month, I will begin making changes to my diabetes management to meet my overall goals (eg - eating, activity and/or insulin).  8

## (undated) DEVICE — WASTE KIT - ST MARY: Brand: MEDLINE INDUSTRIES, INC.

## (undated) DEVICE — PRESSURE MONITORING SET: Brand: TRUWAVE

## (undated) DEVICE — CATH 5F 100CM JL35 -- DXTERITY

## (undated) DEVICE — RADIFOCUS OPTITORQUE ANGIOGRAPHIC CATHETER: Brand: OPTITORQUE

## (undated) DEVICE — KIT HND CTRL 3 W STPCOCK ROT END 54IN PREM HI PRSS TBNG AT

## (undated) DEVICE — TR BAND RADIAL ARTERY COMPRESSION DEVICE: Brand: TR BAND

## (undated) DEVICE — ANGIOGRAPHY KIT

## (undated) DEVICE — PACK PROCEDURE SURG HRT CATH

## (undated) DEVICE — CATH 5F 100CM JR40 -- DXTERITY

## (undated) DEVICE — CATHETER DIAG 6FR L110CM INTRO 6FR BLLN DIA9MM 1CC PULM ART

## (undated) DEVICE — SPLINT WR POS F/ARTERIAL ACC -- BX/10

## (undated) DEVICE — 3M™ TEGADERM™ TRANSPARENT FILM DRESSING FRAME STYLE, 1626W, 4 IN X 4-3/4 IN (10 CM X 12 CM), 50/CT 4CT/CASE: Brand: 3M™ TEGADERM™

## (undated) DEVICE — KIT MED IMAG CNTRST AGNT W/ IOPAMIDOL REUSE

## (undated) DEVICE — KIT MFLD ISOLATN NACL CNTRST PRT TBNG SPIK W/ PRSS TRNSDUC

## (undated) DEVICE — SPECIAL PROCEDURE DRAPE 32" X 34": Brand: SPECIAL PROCEDURE DRAPE

## (undated) DEVICE — GUIDEWIRE VASC L260CM DIA0.035IN TIP L3MM STD EXCHG PTFE J

## (undated) DEVICE — SYNTHETIC CONTROLCATH TD CATHETER: Brand: SWAN-GANZ CONTROLCATH

## (undated) DEVICE — DRAPE PRB US TRNSDCR 6X96IN --

## (undated) DEVICE — GLIDESHEATH SLENDER STAINLESS STEEL KIT: Brand: GLIDESHEATH SLENDER